# Patient Record
Sex: FEMALE | Race: WHITE | Employment: FULL TIME | URBAN - METROPOLITAN AREA
[De-identification: names, ages, dates, MRNs, and addresses within clinical notes are randomized per-mention and may not be internally consistent; named-entity substitution may affect disease eponyms.]

---

## 2019-06-20 LAB — COLONOSCOPY, EXTERNAL: NORMAL

## 2022-07-22 ENCOUNTER — OFFICE VISIT (OUTPATIENT)
Dept: ORTHOPEDIC SURGERY | Age: 67
End: 2022-07-22
Payer: MEDICARE

## 2022-07-22 VITALS — BODY MASS INDEX: 29.26 KG/M2 | HEIGHT: 62 IN | WEIGHT: 159 LBS

## 2022-07-22 DIAGNOSIS — M16.12 PRIMARY OSTEOARTHRITIS OF LEFT HIP: ICD-10-CM

## 2022-07-22 DIAGNOSIS — M25.552 LEFT HIP PAIN: Primary | ICD-10-CM

## 2022-07-22 PROCEDURE — G8427 DOCREV CUR MEDS BY ELIG CLIN: HCPCS | Performed by: ORTHOPAEDIC SURGERY

## 2022-07-22 PROCEDURE — 3017F COLORECTAL CA SCREEN DOC REV: CPT | Performed by: ORTHOPAEDIC SURGERY

## 2022-07-22 PROCEDURE — G8417 CALC BMI ABV UP PARAM F/U: HCPCS | Performed by: ORTHOPAEDIC SURGERY

## 2022-07-22 PROCEDURE — G8432 DEP SCR NOT DOC, RNG: HCPCS | Performed by: ORTHOPAEDIC SURGERY

## 2022-07-22 PROCEDURE — 1101F PT FALLS ASSESS-DOCD LE1/YR: CPT | Performed by: ORTHOPAEDIC SURGERY

## 2022-07-22 PROCEDURE — 99203 OFFICE O/P NEW LOW 30 MIN: CPT | Performed by: ORTHOPAEDIC SURGERY

## 2022-07-22 PROCEDURE — G8400 PT W/DXA NO RESULTS DOC: HCPCS | Performed by: ORTHOPAEDIC SURGERY

## 2022-07-22 PROCEDURE — G8536 NO DOC ELDER MAL SCRN: HCPCS | Performed by: ORTHOPAEDIC SURGERY

## 2022-07-22 PROCEDURE — 1090F PRES/ABSN URINE INCON ASSESS: CPT | Performed by: ORTHOPAEDIC SURGERY

## 2022-07-22 PROCEDURE — 1123F ACP DISCUSS/DSCN MKR DOCD: CPT | Performed by: ORTHOPAEDIC SURGERY

## 2022-07-22 RX ORDER — ERGOCALCIFEROL 1.25 MG/1
CAPSULE ORAL
COMMUNITY
Start: 2022-06-27 | End: 2022-10-06 | Stop reason: ALTCHOICE

## 2022-07-22 RX ORDER — ASCORBIC ACID 500 MG
500 TABLET ORAL 2 TIMES DAILY
COMMUNITY
Start: 2022-05-26 | End: 2022-10-06 | Stop reason: ALTCHOICE

## 2022-07-22 RX ORDER — IRON,CARBONYL/ASCORBIC ACID 65MG-125MG
1 TABLET, DELAYED RELEASE (ENTERIC COATED) ORAL DAILY
COMMUNITY
Start: 2022-05-26 | End: 2022-10-06 | Stop reason: ALTCHOICE

## 2022-07-22 RX ORDER — IBUPROFEN 200 MG
400 TABLET ORAL
COMMUNITY

## 2022-07-22 NOTE — LETTER
7/22/2022    Patient: Jani Black   YOB: 1955   Date of Visit: 7/22/2022     Rukhsana Robles MD  [address] [address]  Via Fax: [phone]    Dear Rukhsana Robles MD,      Thank you for referring Ms. Jani Black to [facility] for evaluation. My notes for this consultation are attached. If you have questions, please do not hesitate to call me. I look forward to following your patient along with you.       Sincerely,    Pippa Lomax MD

## 2022-07-22 NOTE — PROGRESS NOTES
Fantasma Gonzalez (: 1955) is a 78 y.o. female, patient, here for evaluation of the following chief complaint(s):  Hip Pain (Left hip pain - would like to discuss surgery today )       HPI:  Chief complaint left hip pain. Chronic nature of left hip pain greater than 2 years. Slowly progressive symptoms. No images for review today. Patient states that she is thought about this now for some time and is ready to get her hip replaced. She has had x-rays in the past which showed bone against bone. Allergies   Allergen Reactions    Gabapentin Other (comments)     Edema     Lisinopril Cough       Current Outpatient Medications   Medication Sig    ibuprofen (MOTRIN) 200 mg tablet Take 400 mg by mouth two (2) times daily as needed. ergocalciferol (ERGOCALCIFEROL) 1,250 mcg (50,000 unit) capsule TAKE 1 CAPSULE BY MOUTH 1 TIME PER WEEK. iron,carbonyl-vitamin C (Vitron-C) 65 mg iron- 125 mg TbEC Take 1 Tablet by mouth daily. losartan (COZAAR) 100 mg tablet Take 100 mg by mouth daily. hydrochlorothiazide (HYDRODIURIL) 25 mg tablet Take 25 mg by mouth daily. escitalopram oxalate (LEXAPRO) 20 mg tablet Take 20 mg by mouth daily. ARIPiprazole (ABILIFY) 5 mg tablet Take 5 mg by mouth daily. dextroamphetamine-amphetamine (ADDERALL) 15 mg tablet Take 15 mg by mouth two (2) times a day. ALPRAZolam (XANAX) 0.5 mg tablet Take 0.5 mg by mouth. ascorbic acid, vitamin C, (VITAMIN C) 500 mg tablet Take 500 mg by mouth two (2) times a day. (Patient not taking: Reported on 2022)    bacitracin-polymyxin b (POLYSPORIN) ophthalmic ointment Apply every 12 hours to upper and lower lid incisions. oxyCODONE IR (ROXICODONE) 5 mg immediate release tablet Take 1 Tab by mouth every four (4) hours as needed for Pain (May take 2). ondansetron (ZOFRAN ODT) 4 mg disintegrating tablet Take 1 Tab by mouth every eight (8) hours as needed for Nausea.     aspirin delayed-release 81 mg tablet Take 81 mg by mouth daily. No current facility-administered medications for this visit. Past Medical History:   Diagnosis Date    Attention deficit disorder     Hepatitis C     Hypertension     Nausea & vomiting     Other ill-defined conditions(799.89)     hep c- treated 2002    Other ill-defined conditions(799.89)     ADD    Other ill-defined conditions(799.89)     depression    Unspecified adverse effect of anesthesia     delayed awakening         Past Surgical History:   Procedure Laterality Date    HX OTHER SURGICAL  1981    right inguinal lymph node removed- benign    HX OTHER SURGICAL  1990s    exploratoey lap for adhesions       Family History   Problem Relation Age of Onset    Hypertension Mother     Lung Disease Mother     Hypertension Father     Heart Disease Father 39        MI    Cancer Father         head and neck cancer    Hypertension Sister         Social History     Socioeconomic History    Marital status:      Spouse name: Not on file    Number of children: Not on file    Years of education: Not on file    Highest education level: Not on file   Occupational History    Not on file   Tobacco Use    Smoking status: Some Days     Years: 0.50     Types: Cigarettes    Smokeless tobacco: Never    Tobacco comments:     social smoking   Substance and Sexual Activity    Alcohol use:  Yes     Alcohol/week: 11.7 standard drinks     Types: 14 Glasses of wine per week    Drug use: No    Sexual activity: Not on file   Other Topics Concern    Not on file   Social History Narrative    Not on file     Social Determinants of Health     Financial Resource Strain: Not on file   Food Insecurity: Not on file   Transportation Needs: Not on file   Physical Activity: Not on file   Stress: Not on file   Social Connections: Not on file   Intimate Partner Violence: Not on file   Housing Stability: Not on file       ROS    Positive for: Musculoskeletal  Last edited by Jay Roche on 7/22/2022  2:42 PM.            Vitals:  Ht 5' 2\" (1.575 m)   Wt 159 lb (72.1 kg)   BMI 29.08 kg/m²    Body mass index is 29.08 kg/m². PHYSICAL EXAM:  Left lower extremity exam: Hip is quite irritable to rotation. Extends fully and flexes to 90 degrees. There is no trochanteric tenderness. Hip flexors and abductors have 5 x 5 strength. Patient able to stand on the affected lower extremity with negative Trendelenburg. Extremity has intact quads, ankle plantar flexors, ankle dorsiflexors. Skin is intact. Foot is sensate and well perfused. Straight leg raise and femoral nerve stretch test are negative. Left lower extremity 1/4 inch shorter than the right. IMAGING:  XR Results (most recent):  Results from Appointment encounter on 07/22/22    XR HIP LT W OR WO PELV 2-3 VWS    Narrative  3 views left hip. Severe bone-on-bone left hip. Some osteitis pubis. No acute issues are noted. Osteophytes at head neck junction and also posterior acetabulum. Subchondral cyst.        ASSESSMENT/PLAN:  1. Left hip pain  -     XR HIP LT W OR WO PELV 2-3 VWS; Future  2. Primary osteoarthritis of left hip    End-stage DJD left hip. We discussed continued conservative treatment measures versus total joint replacement. Symptoms have progressed despite conservative treatment measures outlined above and they desire to proceed with total hip. Patient has had symptoms for over 24 months. They have decline in their activities of daily living with inability to walk long distances. There has been progressive decline in function. Discussed risks, benefits, and alternatives in detail, as well as anticipated hospital stay and course of rehabilitation. They will see their primary care physician prior to surgery. All questions answered. An electronic signature was used to authenticate this note.   --Vonna Goltz, MD

## 2022-08-02 DIAGNOSIS — M16.12 PRIMARY OSTEOARTHRITIS OF LEFT HIP: Primary | ICD-10-CM

## 2022-10-06 ENCOUNTER — OFFICE VISIT (OUTPATIENT)
Dept: INTERNAL MEDICINE CLINIC | Age: 67
End: 2022-10-06
Payer: MEDICARE

## 2022-10-06 VITALS
RESPIRATION RATE: 20 BRPM | DIASTOLIC BLOOD PRESSURE: 59 MMHG | WEIGHT: 154.6 LBS | HEART RATE: 83 BPM | TEMPERATURE: 98.4 F | SYSTOLIC BLOOD PRESSURE: 108 MMHG | OXYGEN SATURATION: 96 % | HEIGHT: 62 IN | BODY MASS INDEX: 28.45 KG/M2

## 2022-10-06 DIAGNOSIS — I10 PRIMARY HYPERTENSION: Primary | ICD-10-CM

## 2022-10-06 DIAGNOSIS — I10 PRIMARY HYPERTENSION: ICD-10-CM

## 2022-10-06 DIAGNOSIS — M16.12 PRIMARY OSTEOARTHRITIS OF LEFT HIP: ICD-10-CM

## 2022-10-06 DIAGNOSIS — Z01.818 PREOP EXAM FOR INTERNAL MEDICINE: ICD-10-CM

## 2022-10-06 DIAGNOSIS — F51.01 PRIMARY INSOMNIA: ICD-10-CM

## 2022-10-06 DIAGNOSIS — Z23 NEEDS FLU SHOT: ICD-10-CM

## 2022-10-06 DIAGNOSIS — F33.1 MODERATE EPISODE OF RECURRENT MAJOR DEPRESSIVE DISORDER (HCC): ICD-10-CM

## 2022-10-06 PROCEDURE — G8427 DOCREV CUR MEDS BY ELIG CLIN: HCPCS | Performed by: INTERNAL MEDICINE

## 2022-10-06 PROCEDURE — 1090F PRES/ABSN URINE INCON ASSESS: CPT | Performed by: INTERNAL MEDICINE

## 2022-10-06 PROCEDURE — G8399 PT W/DXA RESULTS DOCUMENT: HCPCS | Performed by: INTERNAL MEDICINE

## 2022-10-06 PROCEDURE — G0463 HOSPITAL OUTPT CLINIC VISIT: HCPCS | Performed by: INTERNAL MEDICINE

## 2022-10-06 PROCEDURE — 1101F PT FALLS ASSESS-DOCD LE1/YR: CPT | Performed by: INTERNAL MEDICINE

## 2022-10-06 PROCEDURE — 90694 VACC AIIV4 NO PRSRV 0.5ML IM: CPT | Performed by: INTERNAL MEDICINE

## 2022-10-06 PROCEDURE — 3017F COLORECTAL CA SCREEN DOC REV: CPT | Performed by: INTERNAL MEDICINE

## 2022-10-06 PROCEDURE — G8536 NO DOC ELDER MAL SCRN: HCPCS | Performed by: INTERNAL MEDICINE

## 2022-10-06 PROCEDURE — G8511 SCR DEP POS, NO PLAN DOC RNG: HCPCS | Performed by: INTERNAL MEDICINE

## 2022-10-06 PROCEDURE — 99204 OFFICE O/P NEW MOD 45 MIN: CPT | Performed by: INTERNAL MEDICINE

## 2022-10-06 PROCEDURE — G8417 CALC BMI ABV UP PARAM F/U: HCPCS | Performed by: INTERNAL MEDICINE

## 2022-10-06 PROCEDURE — 93010 ELECTROCARDIOGRAM REPORT: CPT | Performed by: INTERNAL MEDICINE

## 2022-10-06 PROCEDURE — 93005 ELECTROCARDIOGRAM TRACING: CPT | Performed by: INTERNAL MEDICINE

## 2022-10-06 RX ORDER — GLUCOSAMINE SULFATE 1500 MG
25 POWDER IN PACKET (EA) ORAL DAILY
COMMUNITY

## 2022-10-06 RX ORDER — DEXTROAMPHETAMINE SACCHARATE, AMPHETAMINE ASPARTATE, DEXTROAMPHETAMINE SULFATE AND AMPHETAMINE SULFATE 5; 5; 5; 5 MG/1; MG/1; MG/1; MG/1
20 TABLET ORAL 2 TIMES DAILY
COMMUNITY
Start: 2022-08-30

## 2022-10-06 RX ORDER — ARIPIPRAZOLE 10 MG/1
10 TABLET ORAL DAILY
Qty: 30 TABLET | Refills: 5 | Status: SHIPPED | OUTPATIENT
Start: 2022-10-06 | End: 2022-10-21 | Stop reason: SDUPTHER

## 2022-10-06 RX ORDER — MIRTAZAPINE 15 MG/1
15 TABLET, FILM COATED ORAL AS NEEDED
COMMUNITY
Start: 2022-03-01

## 2022-10-06 NOTE — PROGRESS NOTES
HISTORY OF PRESENT ILLNESS  Macario Perkins is a 75 y.o. female. HPI  New patient to our practice. Hx htn, depression. , insomnia and arthritis. Depression - on Mirtazepine for sleep. Not very effective. Seeing neuropsychiatrist who manages her Adderall (for depression and ADD) but he is retiring in December. Previous PCP was managing her Lexapro, Abilify and Mirtazepine. Used to have Xanax but rx . Low energy and not engaged. Has tried to stop her anti-depressants but \"disasterous\". Also tried other medications than Lexapro but had severe side effects. Htn - on losartan and hctz for approx 10 years. No CP, tightness, sob, le edema, dizziness or palpitations. Hx of hep C but treated and virus has cleared. -     Arthritis - both knees, hips and shoulders. To have Left THR  with Dr. Gilles Truong. Needs preop forms completed. Past Medical History:   Diagnosis Date    Attention deficit disorder     Hepatitis C     Hypertension     Nausea & vomiting     Other ill-defined conditions(799.89)     hep c- treated     Other ill-defined conditions(799.89)     ADD    Other ill-defined conditions(799.89)     depression    Unspecified adverse effect of anesthesia     delayed awakening      Past Surgical History:   Procedure Laterality Date    HX OTHER SURGICAL      right inguinal lymph node removed- benign    HX OTHER SURGICAL      exploratoey lap for adhesions       Current Outpatient Medications:     ibuprofen (MOTRIN) 200 mg tablet, Take 400 mg by mouth two (2) times daily as needed. , Disp: , Rfl:     ascorbic acid, vitamin C, (VITAMIN C) 500 mg tablet, Take 500 mg by mouth two (2) times a day. (Patient not taking: Reported on 2022), Disp: , Rfl:     ergocalciferol (ERGOCALCIFEROL) 1,250 mcg (50,000 unit) capsule, TAKE 1 CAPSULE BY MOUTH 1 TIME PER WEEK., Disp: , Rfl:     iron,carbonyl-vitamin C (Vitron-C) 65 mg iron- 125 mg TbEC, Take 1 Tablet by mouth daily. , Disp: , Rfl:     losartan (COZAAR) 100 mg tablet, Take 100 mg by mouth daily. , Disp: , Rfl:     hydrochlorothiazide (HYDRODIURIL) 25 mg tablet, Take 25 mg by mouth daily. , Disp: , Rfl:     escitalopram oxalate (LEXAPRO) 20 mg tablet, Take 20 mg by mouth daily. , Disp: , Rfl:     ARIPiprazole (ABILIFY) 5 mg tablet, Take 5 mg by mouth daily. , Disp: , Rfl:     dextroamphetamine-amphetamine (ADDERALL) 15 mg tablet, Take 15 mg by mouth two (2) times a day., Disp: , Rfl:     ALPRAZolam (XANAX) 0.5 mg tablet, Take 0.5 mg by mouth., Disp: , Rfl:     Family History   Problem Relation Age of Onset    Hypertension Mother     Lung Disease Mother     Hypertension Father     Heart Disease Father 39        MI    Cancer Father         head and neck cancer    Hypertension Sister       Social hx reviewed and updated in chart. Review of Systems   Constitutional:  Positive for malaise/fatigue and weight loss (intentional. over 1 year has lost 16 to 17 lbs.  eating less). Negative for chills, diaphoresis and fever. HENT: Negative. Eyes: Negative. Wears reading glasses. Respiratory: Negative. Cardiovascular: Negative. Gastrointestinal: Negative. Genitourinary:  Negative for dysuria, flank pain, frequency, hematuria and urgency. Mixed urinary incontinence   Musculoskeletal:  Positive for back pain (low back pain, spinal stenosis. Had TABBY several years ago) and joint pain. Negative for myalgias and neck pain. Skin:         Psoriasis. Previously treated with light treatment. Neurological:  Negative for dizziness, tingling, tremors, sensory change, speech change, focal weakness, weakness and headaches. Endo/Heme/Allergies:  Negative for environmental allergies and polydipsia. Bruises/bleeds easily. Psychiatric/Behavioral:  Positive for depression. Negative for suicidal ideas. The patient has insomnia. The patient is not nervous/anxious.       Visit Vitals  BP (!) 108/59 (BP 1 Location: Left upper arm, BP Patient Position: Sitting, BP Cuff Size: Large adult)   Pulse 83   Temp 98.4 °F (36.9 °C) (Oral)   Resp 20   Ht 5' 2\" (1.575 m)   Wt 154 lb 9.6 oz (70.1 kg)   SpO2 96%   BMI 28.28 kg/m²       Physical Exam  Vitals reviewed. Constitutional:       Appearance: Normal appearance. HENT:      Head: Normocephalic and atraumatic. Right Ear: Tympanic membrane, ear canal and external ear normal.      Left Ear: Tympanic membrane, ear canal and external ear normal.      Nose: Nose normal.      Mouth/Throat:      Mouth: Mucous membranes are moist.      Pharynx: Oropharynx is clear. Eyes:      Extraocular Movements: Extraocular movements intact. Conjunctiva/sclera: Conjunctivae normal.      Pupils: Pupils are equal, round, and reactive to light. Neck:      Vascular: No carotid bruit. Cardiovascular:      Rate and Rhythm: Normal rate and regular rhythm. Pulses: Normal pulses. Heart sounds: Normal heart sounds. Pulmonary:      Effort: Pulmonary effort is normal.      Breath sounds: Normal breath sounds. Abdominal:      General: Abdomen is flat. Bowel sounds are normal. There is no distension. Palpations: Abdomen is soft. There is no mass. Tenderness: There is no abdominal tenderness. Musculoskeletal:      Cervical back: Normal range of motion and neck supple. Right lower leg: No edema. Left lower leg: No edema. Lymphadenopathy:      Cervical: No cervical adenopathy. Skin:     General: Skin is warm and dry. Neurological:      General: No focal deficit present. Mental Status: She is alert. ASSESSMENT and PLAN  Diagnoses and all orders for this visit:    1. Primary hypertension - well controlled, cont Losartan 100mg daily and hctz 25mg every day. -     AMB POC EKG ROUTINE W/ 12 LEADS, INTER & REP  -     MRSA SCREENING CULTURE  -     PROTHROMBIN TIME + INR; Future  -     HEMOGLOBIN A1C WITH EAG;  Future  -     URINALYSIS W/ REFLEX CULTURE; Future  - METABOLIC PANEL, BASIC; Future  -     CBC W/O DIFF; Future    2. Primary osteoarthritis of left hip - upcoming THR. Preop forms completed. -     AMB POC EKG ROUTINE W/ 12 LEADS, INTER & REP  -     MRSA SCREENING CULTURE  -     PROTHROMBIN TIME + INR; Future  -     HEMOGLOBIN A1C WITH EAG; Future  -     URINALYSIS W/ REFLEX CULTURE; Future  -     METABOLIC PANEL, BASIC; Future  -     CBC W/O DIFF; Future    3. Moderate episode of recurrent major depressive disorder (HCC) - not at goal . Continue same dose Lexapro 20mg every day. Increase Ability to 20mg daily. Continue Adderall 20mg twice a day  -     ARIPiprazole (ABILIFY) 10 mg tablet; Take 1 Tablet by mouth daily. 4. Primary insomnia - not as well controlled. Continue current dose Mirtazapine 15mg qhs for now but may change or increase in the future    5. Preop exam for internal medicine - see scanned preop form. -     AMB POC EKG ROUTINE W/ 12 LEADS, INTER & REP  -     MRSA SCREENING CULTURE  -     PROTHROMBIN TIME + INR; Future  -     HEMOGLOBIN A1C WITH EAG; Future  -     URINALYSIS W/ REFLEX CULTURE; Future  -     METABOLIC PANEL, BASIC; Future  -     CBC W/O DIFF; Future    6.  Needs flu shot  -     INFLUENZA, FLUAD, (AGE 72 Y+), IM, PF, 0.5 ML  -     ADMIN INFLUENZA VIRUS VAC      Follow-up and Dispositions    Return in about 2 months (around 12/6/2022) for depression, MWE.

## 2022-10-07 LAB
ANION GAP SERPL CALC-SCNC: 8 MMOL/L (ref 5–15)
APPEARANCE UR: CLEAR
BACTERIA SPEC CULT: NORMAL
BACTERIA URNS QL MICRO: ABNORMAL /HPF
BILIRUB UR QL: NEGATIVE
BUN SERPL-MCNC: 26 MG/DL (ref 6–20)
BUN/CREAT SERPL: 29 (ref 12–20)
CALCIUM SERPL-MCNC: 10.1 MG/DL (ref 8.5–10.1)
CHLORIDE SERPL-SCNC: 101 MMOL/L (ref 97–108)
CO2 SERPL-SCNC: 29 MMOL/L (ref 21–32)
COLOR UR: ABNORMAL
CREAT SERPL-MCNC: 0.89 MG/DL (ref 0.55–1.02)
EPITH CASTS URNS QL MICRO: ABNORMAL /LPF
ERYTHROCYTE [DISTWIDTH] IN BLOOD BY AUTOMATED COUNT: 13.6 % (ref 11.5–14.5)
EST. AVERAGE GLUCOSE BLD GHB EST-MCNC: 111 MG/DL
GLUCOSE SERPL-MCNC: 102 MG/DL (ref 65–100)
GLUCOSE UR STRIP.AUTO-MCNC: NEGATIVE MG/DL
HBA1C MFR BLD: 5.5 % (ref 4–5.6)
HCT VFR BLD AUTO: 43 % (ref 35–47)
HGB BLD-MCNC: 13.5 G/DL (ref 11.5–16)
HGB UR QL STRIP: NEGATIVE
HYALINE CASTS URNS QL MICRO: ABNORMAL /LPF (ref 0–5)
INR PPP: 1 (ref 0.9–1.1)
KETONES UR QL STRIP.AUTO: ABNORMAL MG/DL
LEUKOCYTE ESTERASE UR QL STRIP.AUTO: ABNORMAL
MCH RBC QN AUTO: 30.8 PG (ref 26–34)
MCHC RBC AUTO-ENTMCNC: 31.4 G/DL (ref 30–36.5)
MCV RBC AUTO: 97.9 FL (ref 80–99)
NITRITE UR QL STRIP.AUTO: NEGATIVE
NRBC # BLD: 0 K/UL (ref 0–0.01)
NRBC BLD-RTO: 0 PER 100 WBC
PH UR STRIP: 6.5 [PH] (ref 5–8)
PLATELET # BLD AUTO: 349 K/UL (ref 150–400)
PMV BLD AUTO: 10.2 FL (ref 8.9–12.9)
POTASSIUM SERPL-SCNC: 3.8 MMOL/L (ref 3.5–5.1)
PROT UR STRIP-MCNC: NEGATIVE MG/DL
PROTHROMBIN TIME: 10.8 SEC (ref 9–11.1)
RBC # BLD AUTO: 4.39 M/UL (ref 3.8–5.2)
RBC #/AREA URNS HPF: ABNORMAL /HPF (ref 0–5)
SERVICE CMNT-IMP: NORMAL
SODIUM SERPL-SCNC: 138 MMOL/L (ref 136–145)
SP GR UR REFRACTOMETRY: 1.02 (ref 1–1.03)
UA: UC IF INDICATED,UAUC: ABNORMAL
UROBILINOGEN UR QL STRIP.AUTO: 1 EU/DL (ref 0.2–1)
WBC # BLD AUTO: 8.8 K/UL (ref 3.6–11)
WBC URNS QL MICRO: ABNORMAL /HPF (ref 0–4)

## 2022-10-22 RX ORDER — ARIPIPRAZOLE 10 MG/1
10 TABLET ORAL DAILY
Qty: 30 TABLET | Refills: 5 | Status: SHIPPED | OUTPATIENT
Start: 2022-10-22

## 2022-10-24 ENCOUNTER — TELEPHONE (OUTPATIENT)
Dept: INTERNAL MEDICINE CLINIC | Age: 67
End: 2022-10-24

## 2022-10-24 DIAGNOSIS — Z01.818 PREOP EXAM FOR INTERNAL MEDICINE: ICD-10-CM

## 2022-10-24 DIAGNOSIS — M16.12 PRIMARY OSTEOARTHRITIS OF LEFT HIP: Primary | ICD-10-CM

## 2022-10-24 NOTE — TELEPHONE ENCOUNTER
Pt would like a call about MRSA form with more info as soon as possible. Pt stated that she went to [address] [place] [address],  [address] lab to get this done, and she was told that they were unable to do this.  She would like a callback to know what her next step should be.     [phone]

## 2022-10-26 ENCOUNTER — OFFICE VISIT (OUTPATIENT)
Dept: ORTHOPEDIC SURGERY | Age: 67
End: 2022-10-26
Payer: MEDICARE

## 2022-10-26 DIAGNOSIS — M25.552 LEFT HIP PAIN: Primary | ICD-10-CM

## 2022-10-26 DIAGNOSIS — R26.2 DIFFICULTY WALKING: ICD-10-CM

## 2022-10-26 DIAGNOSIS — M16.12 PRIMARY OSTEOARTHRITIS OF LEFT HIP: ICD-10-CM

## 2022-10-26 PROCEDURE — 97161 PT EVAL LOW COMPLEX 20 MIN: CPT | Performed by: PHYSICAL THERAPIST

## 2022-10-26 PROCEDURE — 97530 THERAPEUTIC ACTIVITIES: CPT | Performed by: PHYSICAL THERAPIST

## 2022-10-26 NOTE — PROGRESS NOTES
Patient Name: Yousif Bonilla  Date:10/26/2022  : 1955  [x]  Patient  Verified  Payor: Jace Rivera / Plan: [place] & [place] / Product Type: Medicare /      Total Treatment Time (min): 45  Total Timed Codes (min): 45    Visit #: 1    Referring Provider: Jaime Walters MD        Preop evaluation left hip    Subjective: The patient is a retired 69-year-old female. She is referred to physical therapy by Dr. Dhara Randall. The patient is referred for preoperative evaluation and home activity instruction for an impending outpatient left anterior total hip arthroplasty. Surgery scheduled for 2022. The patient reports progressive history of longstanding hip pain with failed conservative treatment. She reports difficulty with basic as well as advanced ADLs secondary to her arthritic changes. She reports difficulty with prolonged ambulation has constant pain. She will have help at home following surgery she does have 4 steps into the home.   Past Medical History:   Diagnosis Date    Attention deficit disorder     Hepatitis C     Hypertension     Nausea & vomiting     Other ill-defined conditions(799.89)     hep c- treated     Other ill-defined conditions(799.89)     ADD    Other ill-defined conditions(799.89)     depression    Unspecified adverse effect of anesthesia     delayed awakening      Current Outpatient Medications   Medication Instructions    ALPRAZolam (XANAX) 0.5 mg, Oral, BEDTIME PRN    ARIPiprazole (ABILIFY) 10 mg, Oral, DAILY    cholecalciferol (VITAMIN D3) 25 mcg, Oral, DAILY    dextroamphetamine-amphetamine (ADDERALL) 20 mg tablet 20 mg, Oral, 2 TIMES DAILY    escitalopram oxalate (LEXAPRO) 20 mg, DAILY    hydroCHLOROthiazide (HYDRODIURIL) 25 mg, DAILY    ibuprofen (MOTRIN) 400 mg, Oral, 2 TIMES DAILY AS NEEDED    losartan (COZAAR) 100 mg, DAILY    mirtazapine (REMERON) 15 mg, Oral, AS NEEDED     Social Connections: Not on file       Objective:    Gait: antalgic gait pattern noted on the left lower extremity has decreased stance time as well as lateral trunk shift for maintaining balance. Balance: Single-leg stance reveals less than 2 seconds. He is noted to have femoral adduction and internal rotation with gluteus medius weakness. Range of motion right hip: Flexion 90 degrees, extension neutral abduction 20 degrees abduction neutral  Strength: Quadriceps strength: 4/5 . Hip abduction: 3/5       HIP Flexion:   4/5    Soft tissue: restriction is noted of the quadriceps, rectus, hamstrings and IT band musculature. Neuro Exam: intact    Treatment:  Low complexity evaluation 20 min  Therapeutic activity instruction 25 min   The patient's mobility, range of motion and function was assessed today for postoperative training and instruction. They were instructed in functional gait utilizing a standard rolling walker   Stair training was completed with hand-held assist and rail. Patient was instructed in and completed functional exercises for postoperative quadriceps activation,DVT prophylaxis and swelling control, including elevation and the use of cryotherapy. We discussed transfers to and from a car, sit to/form supine,     Written and pictorial exercises and care instructions were provided to the patient. Assessment:  Patient presents with impairments related to gait, range of motion, quad strength, balance, impaired ability to ambulate, negotiate stairs, perform ADLs and participate in desired activities second to right hip OA and avascular necrosis. Outpatient total hip arthroplasty is scheduled for November 2, 2022. She he will benefit from PT to address above limitations and maximize function  When he returns home prior to the onset of home health therapy. Goals- 1 visit  1. Patient will demonstrate compliance with home exercise program.  2. Patient to demonstrate [place] with gait using device walker/  3.  Patient to demonstrate independence with stairs with hand held assistance and rail    Plan:  Patient to receive home health PT post operatively day 1-3 for 2-4 weeks and then return to the clinic for outpatient Physical Therapy. When she returns for outpatient care we will update her goals and plan of care. The referring physician has reviewed and approved this evaluation and plan of care as noted by the electronic signature attached to note.

## 2022-10-28 LAB — MRSA SPEC QL CULT: NEGATIVE

## 2022-11-01 DIAGNOSIS — M16.12 PRIMARY OSTEOARTHRITIS OF LEFT HIP: Primary | ICD-10-CM

## 2022-11-01 DIAGNOSIS — Z96.642 STATUS POST TOTAL HIP REPLACEMENT, LEFT: ICD-10-CM

## 2022-11-01 RX ORDER — ASPIRIN 325 MG
325 TABLET ORAL 2 TIMES DAILY WITH MEALS
Qty: 60 TABLET | Refills: 0 | Status: SHIPPED | OUTPATIENT
Start: 2022-11-01 | End: 2022-11-29 | Stop reason: ALTCHOICE

## 2022-11-01 RX ORDER — NALOXONE HYDROCHLORIDE 4 MG/.1ML
SPRAY NASAL
Qty: 1 EACH | Refills: 0 | Status: SHIPPED | OUTPATIENT
Start: 2022-11-01

## 2022-11-01 RX ORDER — AMOXICILLIN 250 MG
1 CAPSULE ORAL 2 TIMES DAILY
Qty: 50 TABLET | Refills: 0 | Status: SHIPPED | OUTPATIENT
Start: 2022-11-01

## 2022-11-01 RX ORDER — OXYCODONE HYDROCHLORIDE 5 MG/1
5-10 TABLET ORAL
Qty: 50 TABLET | Refills: 0 | Status: SHIPPED | OUTPATIENT
Start: 2022-11-01 | End: 2022-11-08

## 2022-11-04 RX ORDER — ONDANSETRON 8 MG/1
8 TABLET, ORALLY DISINTEGRATING ORAL
Qty: 12 TABLET | Refills: 0 | Status: SHIPPED | OUTPATIENT
Start: 2022-11-04

## 2022-11-10 DIAGNOSIS — Z96.642 STATUS POST TOTAL HIP REPLACEMENT, LEFT: Primary | ICD-10-CM

## 2022-11-11 RX ORDER — OXYCODONE HYDROCHLORIDE 5 MG/1
5 TABLET ORAL
Qty: 30 TABLET | Refills: 0 | Status: SHIPPED | OUTPATIENT
Start: 2022-11-11 | End: 2022-11-18

## 2022-11-29 ENCOUNTER — OFFICE VISIT (OUTPATIENT)
Dept: ORTHOPEDIC SURGERY | Age: 67
End: 2022-11-29
Payer: MEDICARE

## 2022-11-29 VITALS — HEIGHT: 62 IN | WEIGHT: 154 LBS | BODY MASS INDEX: 28.34 KG/M2

## 2022-11-29 DIAGNOSIS — Z96.642 STATUS POST TOTAL REPLACEMENT OF LEFT HIP: Primary | ICD-10-CM

## 2022-11-29 NOTE — PROGRESS NOTES
Lissette Mistry (: 1955) is a 75 y.o. female, established patient, here for evaluation of the following chief complaint(s):  Surgical Follow-up       SUBJECTIVE/OBJECTIVE:    Lissette Mistry (: 1955) is a 75 y.o. female. Left total hip replacement-2022    The patient is very pleased with the results of her surgery and the progress which she has made to date. The patient states she is able to walk 2 miles without significant discomfort. Allergies   Allergen Reactions    Gabapentin Other (comments)     Edema     Lisinopril Cough       Current Outpatient Medications   Medication Sig    ondansetron (ZOFRAN ODT) 8 mg disintegrating tablet Take 1 Tablet by mouth every eight (8) hours as needed for Nausea or Vomiting. aspirin (ASPIRIN) 325 mg tablet Take 1 Tablet by mouth two (2) times daily (with meals). Indications: post op DVT prevention    senna-docusate (PERICOLACE) 8.6-50 mg per tablet Take 1 Tablet by mouth two (2) times a day. Indications: constipation, opioid induced constipation    naloxone (Narcan) 4 mg/actuation nasal spray Use 1 spray intranasally, then discard. Repeat with new spray every 2 min as needed for opioid overdose symptoms, alternating nostrils. Indications: opioid overdose, decrease in rate & depth of breathing due to opioid drug    ARIPiprazole (ABILIFY) 10 mg tablet Take 1 Tablet by mouth daily. dextroamphetamine-amphetamine (ADDERALL) 20 mg tablet Take 20 mg by mouth two (2) times a day. cholecalciferol (VITAMIN D3) 25 mcg (1,000 unit) cap Take 25 mcg by mouth daily. mirtazapine (REMERON) 15 mg tablet Take 15 mg by mouth as needed. ibuprofen (MOTRIN) 200 mg tablet Take 400 mg by mouth two (2) times daily as needed. losartan (COZAAR) 100 mg tablet Take 100 mg by mouth daily. hydrochlorothiazide (HYDRODIURIL) 25 mg tablet Take 25 mg by mouth daily. escitalopram oxalate (LEXAPRO) 20 mg tablet Take 20 mg by mouth daily.     ALPRAZolam (XANAX) 0.5 mg tablet Take 0.5 mg by mouth nightly as needed. No current facility-administered medications for this visit. Social History     Socioeconomic History    Marital status:      Spouse name: Not on file    Number of children: Not on file    Years of education: Not on file    Highest education level: Not on file   Occupational History    Not on file   Tobacco Use    Smoking status: Former     Years: 0.50     Types: Cigarettes     Quit date: 10/1/2020     Years since quittin.1    Smokeless tobacco: Never    Tobacco comments:     social smoking   Vaping Use    Vaping Use: Never used   Substance and Sexual Activity    Alcohol use: Yes     Alcohol/week: 11.7 standard drinks     Types: 14 Glasses of wine per week    Drug use: No    Sexual activity: Not on file   Other Topics Concern    Not on file   Social History Narrative    Not on file     Social Determinants of Health     Financial Resource Strain: Not on file   Food Insecurity: Not on file   Transportation Needs: Not on file   Physical Activity: Not on file   Stress: Not on file   Social Connections: Not on file   Intimate Partner Violence: Not on file   Housing Stability: Not on file       Past Surgical History:   Procedure Laterality Date    HX OTHER SURGICAL      right inguinal lymph node removed- benign    HX OTHER SURGICAL      exploratoey lap for adhesions       Family History   Problem Relation Age of Onset    Hypertension Mother     Lung Disease Mother     Hypertension Father     Heart Disease Father 39        MI    Cancer Father         head and neck cancer    Hypertension Sister         OB History          2    Para   0    Term                AB   1    Living   0         SAB   1    IAB        Ectopic        Molar        Multiple        Live Births                       REVIEW OF SYSTEMS:    Patient denies any recent fever, chills, nausea, vomiting, chest pain, or shortness of breath.       Vitals:    Ht 5' 2\" (1.575 m) Wt 154 lb (69.9 kg)   BMI 28.17 kg/m²    Body mass index is 28.17 kg/m². PHYSICAL EXAM:    The patient is alert and oriented x3 and in no acute distress. The postoperative wound is well-healed without erythema or drainage. There is pain free ROM of the operative hip. There is no calf tenderness to palpation. Distal motor and sensation is intact. IMAGING:    Results from Appointment encounter on 11/29/22    XR HIP LT W OR WO PELV 2-3 VWS    Narrative  AP pelvis, AP and frog lateral digital view radiographs of the left hip were obtained in the office today and reviewed with the patient and demonstrate anatomic alignment of components with no evidence of hardware loosening or subsidence. Orders Placed This Encounter    XR HIP LT W OR WO PELV 2-3 VWS     Standing Status:   Future     Number of Occurrences:   1     Standing Expiration Date:   11/30/2023    REFERRAL TO PHYSICAL THERAPY     Referral Priority:   Routine     Referral Type:   PT/OT/ST     Requested Specialty:   Physical Therapy     Number of Visits Requested:   1          ASSESSMENT/PLAN:      1. Status post total replacement of left hip  -     XR HIP LT W OR WO PELV 2-3 VWS; Future  -     REFERRAL TO PHYSICAL THERAPY        Below is the assessment and plan developed based on review of pertinent history, physical exam, labs, studies, and medications. Have discussed radiographic findings and answered all patient questions to her satisfaction. The patient is to continue DVT prophylaxis for one more week, then may discontinue. Have provided the patient with a prescription for outpatient physical therapy for range of motion, strengthening and gait training. The patient was asked to follow up in 8 weeks time for reevaluation with Dr Yolanda Momin, sooner should she develop any surgery related complications. The patient was asked to contact the office with any questions or concerns.  The patient understands and agrees to the treatment plan as outlined above. Return in about 2 months (around 1/29/2023) for post op follow up. Dr. Pastora Hamptons was available for immediate consultation as needed. An electronic signature was used to authenticate this note.   -- Joaquin Weaver PA-C

## 2022-12-07 ENCOUNTER — OFFICE VISIT (OUTPATIENT)
Dept: INTERNAL MEDICINE CLINIC | Age: 67
End: 2022-12-07
Payer: MEDICARE

## 2022-12-07 VITALS
TEMPERATURE: 97.6 F | SYSTOLIC BLOOD PRESSURE: 123 MMHG | BODY MASS INDEX: 27.68 KG/M2 | WEIGHT: 150.4 LBS | RESPIRATION RATE: 20 BRPM | DIASTOLIC BLOOD PRESSURE: 59 MMHG | HEIGHT: 62 IN

## 2022-12-07 DIAGNOSIS — F51.01 PRIMARY INSOMNIA: ICD-10-CM

## 2022-12-07 DIAGNOSIS — Z13.6 SCREENING FOR ISCHEMIC HEART DISEASE: ICD-10-CM

## 2022-12-07 DIAGNOSIS — R53.82 CHRONIC FATIGUE: ICD-10-CM

## 2022-12-07 DIAGNOSIS — Z00.00 MEDICARE ANNUAL WELLNESS VISIT, SUBSEQUENT: ICD-10-CM

## 2022-12-07 DIAGNOSIS — M16.12 PRIMARY OSTEOARTHRITIS OF LEFT HIP: ICD-10-CM

## 2022-12-07 DIAGNOSIS — Z12.31 ENCOUNTER FOR SCREENING MAMMOGRAM FOR MALIGNANT NEOPLASM OF BREAST: ICD-10-CM

## 2022-12-07 DIAGNOSIS — F33.1 MODERATE EPISODE OF RECURRENT MAJOR DEPRESSIVE DISORDER (HCC): Primary | ICD-10-CM

## 2022-12-07 PROCEDURE — G0463 HOSPITAL OUTPT CLINIC VISIT: HCPCS | Performed by: INTERNAL MEDICINE

## 2022-12-07 RX ORDER — ZOSTER VACCINE RECOMBINANT, ADJUVANTED 50 MCG/0.5
KIT INTRAMUSCULAR
Qty: 0.5 ML | Refills: 1 | Status: SHIPPED | OUTPATIENT
Start: 2022-12-07

## 2022-12-07 NOTE — PROGRESS NOTES
Chief Complaint   Patient presents with    Follow-up     2 month follow up       Visit Vitals  BP (!) 123/59   Temp 97.6 °F (36.4 °C)   Resp 20   Ht 5' 2\" (1.575 m)   Wt 150 lb 6.4 oz (68.2 kg)   BMI 27.51 kg/m²

## 2022-12-07 NOTE — PATIENT INSTRUCTIONS
Medicare Wellness Visit, Female     The best way to live healthy is to have a lifestyle where you eat a well-balanced diet, exercise regularly, limit alcohol use, and quit all forms of tobacco/nicotine, if applicable. Regular preventive services are another way to keep healthy. Preventive services (vaccines, screening tests, monitoring & exams) can help personalize your care plan, which helps you manage your own care. Screening tests can find health problems at the earliest stages, when they are easiest to treat. [facility] follows the current, evidence-based guidelines published by the [facility] ([facility]) when recommending preventive services for our patients. Because we follow these guidelines, sometimes recommendations change over time as research supports it. (For example, mammograms used to be recommended annually. Even though Medicare will still pay for an annual mammogram, the newer guidelines recommend a mammogram every two years for women of average risk). Of course, you and your doctor may decide to screen more often for some diseases, based on your risk and your co-morbidities (chronic disease you are already diagnosed with). Preventive services for you include:  - Medicare offers their members a free annual wellness visit, which is time for you and your primary care provider to discuss and plan for your preventive service needs.  Take advantage of this benefit every year!    -Over the age of 72 should receive the recommended pneumonia vaccines.    -All adults should have a flu vaccine yearly.  -All adults should have a tetanus vaccine every 10 years.   -Over the age 48 should receive the shingles vaccines.        -All adults should be screened once for Hepatitis C.  -All adults age 38-68 who are overweight should have a diabetes screening test once every three years.   -Other screening tests and preventive services for persons with diabetes include: an eye exam to screen for diabetic retinopathy, a kidney function test, a foot exam, and stricter control over your cholesterol.   -Cardiovascular screening for adults with routine risk involves an electrocardiogram (ECG) at intervals determined by your doctor.     -Colorectal cancer screenings should be done for adults age 39-70 with no increased risk factors for colorectal cancer. There are a number of acceptable methods of screening for this type of cancer. Each test has its own benefits and drawbacks. Discuss with your doctor what is most appropriate for you during your annual wellness visit. The different tests include: colonoscopy (considered the best screening method), a fecal occult blood test, a fecal DNA test, and sigmoidoscopy.    -Lung cancer screening is recommended annually with a low dose CT scan for adults between age 54 and 68, who have smoked at least 30 pack years (equivalent of 1 pack per day for 30 days), and who is a current smoker or quit less than 15 years ago.    -A bone mass density test is recommended when a woman turns 65 to screen for osteoporosis. This test is only recommended one time, as a screening. Some providers will use this same test as a disease monitoring tool if you already have osteoporosis. -Breast cancer screenings are recommended every other year for women of normal risk, age 54-69.    -Cervical cancer screenings for women over age 72 are only recommended with certain risk factors.      Here is a list of your current Health Maintenance items (your personalized list of preventive services) with a due date:  Health Maintenance Due   Topic Date Due    Hepatitis C Screening  Had Hep C, s/p successful tx    COVID-19 Vaccine (1) Completed    DTaP/Tdap/Td series (1 - Tdap) Prescription given    Shingrix Vaccine Age 50> (1 of 2) Never done - needs second dose    Breast Cancer Screen Mammogram  Ordered today    Lipid Screen  Ordered today    Medicare Yearly Exam 12/07/2023           Mucinex (plain not D or DM) 1200mg twice a day  Saline nasal spray 2 squirts each nostril 2-3 times daily.        Psychiatrist  Check with Dr. Mary Chaudhari group  [facility] psychiatry group  [address] [facility]

## 2022-12-07 NOTE — PROGRESS NOTES
HISTORY OF PRESENT ILLNESS  Jazmine Mackay is a 75 y.o. female. HPI  Seein as new patient 2 months ago. Long standing hx of depression, previously managed by psychiatirst.  Last visit increased Abilify to 20mg qhs. Continues on same dose Xanax 0.5mg qhs prn, Lexapro 20mg every day and Adderall 20mg bid. Has kory taking Mirtazapine 15mg qhs prn sleep. Remains very fatigued, not motivated. ? If something else going on. Exhaustion - started well before her hip replacement. No sob or chest pain. Non refreshed sleep. Had sleep study between 5-10 years ago showing mild CELESTE but not worth treating. Falls asleep during day. After grocery shopping needs to rest when gets home. Similar to other episodes of fatigue in the past.  Adderall used to help but not much anymore. No dz of chronic fatigue syndrome. Since last visit had left THR replacement. Doing great 5 weeks ago. Walking unassisted, \"barely a limp\". Off all pain meds. Current Outpatient Medications   Medication Instructions    ALPRAZolam (XANAX) 0.5 mg, Oral, BEDTIME PRN    ARIPiprazole (ABILIFY) 10 mg, Oral, DAILY    cholecalciferol (VITAMIN D3) 25 mcg, Oral, DAILY    dextroamphetamine-amphetamine (ADDERALL) 20 mg tablet 20 mg, Oral, 2 TIMES DAILY    diph,pertuss,acel,,tetanus vac,PF, (ADACEL) 2 Lf-(2.5-5-3-5 mcg)-5Lf/0.5 mL syrg vaccine 0.5 mL, IntraMUSCular, ONCE    escitalopram oxalate (LEXAPRO) 20 mg, DAILY    hydroCHLOROthiazide (HYDRODIURIL) 25 mg, DAILY    losartan (COZAAR) 100 mg, DAILY    mirtazapine (REMERON) 15 mg, Oral, AS NEEDED    naloxone (Narcan) 4 mg/actuation nasal spray Use 1 spray intranasally, then discard. Repeat with new spray every 2 min as needed for opioid overdose symptoms, alternating nostrils.     ondansetron (ZOFRAN ODT) 8 mg, Oral, EVERY 8 HOURS AS NEEDED    senna-docusate (PERICOLACE) 8.6-50 mg per tablet 1 Tablet, Oral, 2 TIMES DAILY    varicella-zoster recombinant, PF, (Shingrix, PF,) 50 mcg/0.5 mL susr injection 0.5mL by IntraMUSCular route once now and then repeat in 2-6 months       Visit Vitals  BP (!) 123/59   Temp 97.6 °F (36.4 °C)   Resp 20   Ht 5' 2\" (1.575 m)   Wt 150 lb 6.4 oz (68.2 kg)   BMI 27.51 kg/m²       ROS  See above  Physical Exam  Vitals reviewed. Constitutional:       Appearance: Normal appearance. HENT:      Head: Normocephalic and atraumatic. Nose: Congestion and rhinorrhea (clear) present. Mouth/Throat:      Mouth: Mucous membranes are moist.      Comments: Post nasal drainage  Neck:      Vascular: No carotid bruit. Comments: No TM  Cardiovascular:      Rate and Rhythm: Normal rate and regular rhythm. Pulses: Normal pulses. Heart sounds: Normal heart sounds. Pulmonary:      Effort: Pulmonary effort is normal.      Breath sounds: Normal breath sounds. Abdominal:      General: Abdomen is flat. There is no distension. Palpations: Abdomen is soft. There is no mass. Tenderness: There is no abdominal tenderness. Musculoskeletal:      Cervical back: Neck supple. Lymphadenopathy:      Cervical: No cervical adenopathy. Neurological:      Mental Status: She is alert. ASSESSMENT and PLAN  Diagnoses and all orders for this visit:    1. Moderate episode of recurrent major depressive disorder (HCC) - not controlled and contributing to fatigue. Referred back to psychiatry. Had seen Dr. Nuzhat Washington in past prior to his custodial. Continue Ability 20mg qhs, Adderall 20mg bid, Lexapro 20mg every day and Xanax 0.5mg prn    2. Chronic fatigue - Most likely secondary to above but ? CELESTE. Referred to sleep medicine.    -     SLEEP MEDICINE REFERRAL    3. Primary insomnia - continues despite meds. Again, sleep consult as above. Continue Mirtazapine 15mg qhs    4. Primary osteoarthritis of left hip - doing well 5 weeks s/p L THR. Continue PT    5. Medicare annual wellness visit, subsequent    6.  Screening for ischemic heart disease  -     LIPID PANEL; Future    7. Encounter for screening mammogram for malignant neoplasm of breast  -     [facility] MAMMO BI SCREENING INCL CAD; Future    Other orders  -     varicella-zoster recombinant, PF, (Shingrix, PF,) 50 mcg/0.5 mL susr injection; 0.5mL by IntraMUSCular route once now and then repeat in 2-6 months  -     diph,pertuss,acel,,tetanus vac,PF, (ADACEL) 2 Lf-(2.5-5-3-5 mcg)-5Lf/0.5 mL syrg vaccine; 0.5 mL by IntraMUSCular route once for 1 dose. 'This is the Subsequent Medicare Annual Wellness Exam, performed 12 months or more after the Initial AWV or the last Subsequent AWV    I have reviewed the patient's medical history in detail and updated the computerized patient record. Assessment/Plan   Education and counseling provided:  Are appropriate based on today's review and evaluation  Advanced directive discussed with patient. 1. Moderate episode of recurrent major depressive disorder ([address]  2. Chronic fatigue  -     SLEEP MEDICINE REFERRAL  3. Primary insomnia  4. Primary osteoarthritis of left hip  5. Medicare annual wellness visit, subsequent  6. Screening for ischemic heart disease  -     LIPID PANEL; Future  7. Encounter for screening mammogram for malignant neoplasm of breast  -     [facility] MAMMO BI SCREENING INCL CAD;  Future       Depression Risk Factor Screening     3 most recent PHQ Screens 12/7/2022   Little interest or pleasure in doing things Not at all   Feeling down, depressed, irritable, or hopeless Not at all   Total Score PHQ 2 0   Trouble falling or staying asleep, or sleeping too much -   Feeling tired or having little energy -   Poor appetite, weight loss, or overeating -   Feeling bad about yourself - or that you are a failure or have let yourself or your family down -   Trouble concentrating on things such as school, work, reading, or watching TV -   Moving or speaking so slowly that other people could have noticed; or the opposite being so fidgety that others notice -   Thoughts of being better off dead, or hurting yourself in some way -   PHQ 9 Score -   How difficult have these problems made it for you to do your work, take care of your home and get along with others -       Alcohol & Drug Abuse Risk Screen    Do you average more than 1 drink per night or more than 7 drinks a week:  No    On any one occasion in the past three months have you have had more than 3 drinks containing alcohol:  No          Functional Ability and Level of Safety    Hearing: Hearing is good. Activities of Daily Living: The home contains: no safety equipment. Patient does total self care      Ambulation: with no difficulty     Fall Risk:  Fall Risk Assessment, last 12 mths 12/7/2022   Able to walk? Yes   Fall in past 12 months? 0   Do you feel unsteady? 0   Are you worried about falling 0   Number of falls in past 12 months -   Fall with injury?  -      Abuse Screen:  Patient is not abused       Cognitive Screening    Has your family/caregiver stated any concerns about your memory: no     Cognitive Screening: Normal -      Health Maintenance Due     Health Maintenance Due   Topic Date Due    Hepatitis C Screening  Had Hep C, s/p successful tx    COVID-19 Vaccine (1) Completed    DTaP/Tdap/Td series (1 - Tdap) Prescription given    Shingrix Vaccine Age 49> (1 of 2) Never done - needs second dose    Breast Cancer Screen Mammogram  Ordered today    Lipid Screen  Ordered today    Medicare Yearly Exam  12/07/2023       Patient Care Team   Patient Care Team:  Rafael Cleaning MD as PCP - General (Internal Medicine Physician)  Rafael Cleaning MD as PCP - [facility] Empaneled Provider    History     Patient Active Problem List   Diagnosis Code    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     Past Medical History:   Diagnosis Date    Attention deficit disorder     Hepatitis C     Hypertension     Nausea & vomiting     Other ill-defined conditions(799.89)     hep c- treated 2002    Other ill-defined conditions(799.89)     ADD Other ill-defined conditions(089.89)     depression    Unspecified adverse effect of anesthesia     delayed awakening       Past Surgical History:   Procedure Laterality Date    HX OTHER SURGICAL  1981    right inguinal lymph node removed- benign    HX OTHER SURGICAL  1990s    exploratoey lap for adhesions     Current Outpatient Medications   Medication Sig Dispense Refill    varicella-zoster recombinant, PF, (Shingrix, PF,) 50 mcg/0.5 mL susr injection 0.5mL by IntraMUSCular route once now and then repeat in 2-6 months 0.5 mL 1    diph,pertuss,acel,,tetanus vac,PF, (ADACEL) 2 Lf-(2.5-5-3-5 mcg)-5Lf/0.5 mL syrg vaccine 0.5 mL by IntraMUSCular route once for 1 dose. 0.5 mL 0    ondansetron (ZOFRAN ODT) 8 mg disintegrating tablet Take 1 Tablet by mouth every eight (8) hours as needed for Nausea or Vomiting. 12 Tablet 0    senna-docusate (PERICOLACE) 8.6-50 mg per tablet Take 1 Tablet by mouth two (2) times a day. Indications: constipation, opioid induced constipation 50 Tablet 0    naloxone (Narcan) 4 mg/actuation nasal spray Use 1 spray intranasally, then discard. Repeat with new spray every 2 min as needed for opioid overdose symptoms, alternating nostrils. Indications: opioid overdose, decrease in rate & depth of breathing due to opioid drug 1 Each 0    ARIPiprazole (ABILIFY) 10 mg tablet Take 1 Tablet by mouth daily. 30 Tablet 5    dextroamphetamine-amphetamine (ADDERALL) 20 mg tablet Take 20 mg by mouth two (2) times a day. cholecalciferol (VITAMIN D3) 25 mcg (1,000 unit) cap Take 25 mcg by mouth daily. mirtazapine (REMERON) 15 mg tablet Take 15 mg by mouth as needed. losartan (COZAAR) 100 mg tablet Take 100 mg by mouth daily. hydrochlorothiazide (HYDRODIURIL) 25 mg tablet Take 25 mg by mouth daily. escitalopram oxalate (LEXAPRO) 20 mg tablet Take 20 mg by mouth daily. ALPRAZolam (XANAX) 0.5 mg tablet Take 0.5 mg by mouth nightly as needed.        Allergies   Allergen Reactions Gabapentin Other (comments)     Edema     Lisinopril Cough       Family History   Problem Relation Age of Onset    Hypertension Mother     Lung Disease Mother     Hypertension Father     Heart Disease Father 39        MI    Cancer Father         head and neck cancer    Hypertension Sister      Social History     Tobacco Use    Smoking status: Former     Years: 0.50     Types: Cigarettes     Quit date: 10/1/2020     Years since quittin.1    Smokeless tobacco: Never    Tobacco comments:     social smoking   Substance Use Topics    Alcohol use:  Yes     Alcohol/week: 11.7 standard drinks     Types: 14 Glasses of wine per week         New Bennett MD

## 2022-12-08 ENCOUNTER — OFFICE VISIT (OUTPATIENT)
Dept: ORTHOPEDIC SURGERY | Age: 67
End: 2022-12-08
Payer: MEDICARE

## 2022-12-08 DIAGNOSIS — R26.2 DIFFICULTY WALKING: ICD-10-CM

## 2022-12-08 DIAGNOSIS — M25.552 LEFT HIP PAIN: Primary | ICD-10-CM

## 2022-12-08 LAB
CHOLEST SERPL-MCNC: 208 MG/DL
HDLC SERPL-MCNC: 69 MG/DL
HDLC SERPL: 3 {RATIO} (ref 0–5)
LDLC SERPL CALC-MCNC: 117.4 MG/DL (ref 0–100)
TRIGL SERPL-MCNC: 108 MG/DL (ref ?–150)
VLDLC SERPL CALC-MCNC: 21.6 MG/DL

## 2022-12-08 PROCEDURE — 97161 PT EVAL LOW COMPLEX 20 MIN: CPT | Performed by: PHYSICAL THERAPIST

## 2022-12-08 PROCEDURE — 97110 THERAPEUTIC EXERCISES: CPT | Performed by: PHYSICAL THERAPIST

## 2022-12-08 NOTE — PROGRESS NOTES
PHYSICAL THERAPY EVALUATION  Patient Name: Tamiko Castro  Date:2022  : 1955  [x]  Patient  Verified  Payor: Viky Hernandez / Plan: [place] MEDICARE PART A & B / Product Type: Medicare /    Total Treatment Time (min): 60 minutes  Referring physician: Toby Apple  Visit #: 1      HISTORY OF PRESENT ILLNESS: Patient is a 79-year-old female for to physical therapy for evaluation and treatment of her left hip. Patient reports approximate 1 year history of left hip pain. She underwent a left anterior total hip replacement on . She had subsequent home health physical therapy without issues. She is currently walking without assistive device. Currently at rest she rates her pain at a 0 out of 10 and states will increase to a 5 out of 10 at its highest.  Symptoms are localized to the lateral aspect of her hip and are described as a deep dull ache. Symptoms are worsened with walking prolonged periods and going up steps. Symptoms are improved with Tylenol. She denies any recent falls    Functional impairments: Difficulty going up steps    Past Medical Hx: Hypertension well-controlled, stenosis    Social history: Retired. Lives with  with 5 steps to enter home    OBJECTIVE    Gait/General observation: Arrives without assistive device. There is decreased hip extension on the left when compared to the right. Incision healing well without signs or symptoms of infection    Functional tests: Able to achieve and maintain single limb stance on the right for approximately 15 seconds. Able to achieve and maintain single limb stance on the left for approximately 10 seconds. Demonstrates quad dominant squat pattern with slight weight shift to the right as well as bilateral heel cord tightness. Palpation: Tender over anterior hip    Range of motion: 80 degrees of hip flexion on the left, 15 degrees of internal and external rotation.   Extension to neutral    Strength: Able to perform adequate quad set    Flexibility: Mild hamstring restrictions bilaterally    Special tests: Negative Homans     LEFS: 42 out of 80    Treatment: Therapeutic exercise: 15 minutes    PT Exercise Log        Activity/Exercise Date  12/08/22    Activity/Exercise      NuStep   X      Heel slides X     Bridges   X       Clamshells supine X     Slant X     Balance X                         Prognosis: Good    ASSESSMENT:    Patient presents with the Above impairments secondary to left total hip replacement. Patient will benefit from skilled outpatient physical therapy services to address above limitations and maximize function. Short-Term Goals (1 weeks)  1. Patient will be independent and compliant with home exercise program to facilitate recovery. Long-Term Goals (4-6 weeks)  1. Patient will report improvement in pain by at least 25%. 2. Patient will increase score on LEFS at least 9 points  3. Patient will walk at least 2 miles over unlevel surfaces    PLAN:  1-2x weekly. Duration 20 visits. Interventions to include but are not limited to joint mobilizations, soft tissue mobilization, myofascial release, therapeutic exercise, neuromuscular reeducation, and modalities as indicated.

## 2022-12-14 ENCOUNTER — OFFICE VISIT (OUTPATIENT)
Dept: ORTHOPEDIC SURGERY | Age: 67
End: 2022-12-14
Payer: MEDICARE

## 2022-12-14 DIAGNOSIS — M25.552 LEFT HIP PAIN: Primary | ICD-10-CM

## 2022-12-14 DIAGNOSIS — R26.2 DIFFICULTY WALKING: ICD-10-CM

## 2022-12-14 PROCEDURE — 97110 THERAPEUTIC EXERCISES: CPT | Performed by: PHYSICAL THERAPIST

## 2022-12-14 PROCEDURE — 97140 MANUAL THERAPY 1/> REGIONS: CPT | Performed by: PHYSICAL THERAPIST

## 2022-12-15 ENCOUNTER — OFFICE VISIT (OUTPATIENT)
Dept: SLEEP MEDICINE | Age: 67
End: 2022-12-15
Payer: MEDICARE

## 2022-12-15 VITALS
HEIGHT: 62 IN | DIASTOLIC BLOOD PRESSURE: 56 MMHG | HEART RATE: 73 BPM | TEMPERATURE: 98.2 F | WEIGHT: 148 LBS | BODY MASS INDEX: 27.23 KG/M2 | OXYGEN SATURATION: 94 % | SYSTOLIC BLOOD PRESSURE: 102 MMHG

## 2022-12-15 DIAGNOSIS — G47.33 OBSTRUCTIVE SLEEP APNEA (ADULT) (PEDIATRIC): Primary | ICD-10-CM

## 2022-12-15 DIAGNOSIS — G47.00 INSOMNIA, UNSPECIFIED TYPE: ICD-10-CM

## 2022-12-15 NOTE — PROGRESS NOTES
PT DAILY TREATMENT NOTE    Patient Name: Tamiko Castro    : 1955  [x]  Patient  Verified  Payor: VA MEDICARE / Plan: [place] MEDICARE PART A & B / Product Type: Medicare /    Total Treatment Time (min): 60  Referring Physician: Nelwyn Schirmer, MD    1. Left hip pain      2. Difficulty walking         ICD-10-CM ICD-9-CM    1. Left hip pain  M25.552 719.45       2. Difficulty walking  R26.2 719.7           SUBJECTIVE  Subjective functional status/changes:   [] No changes reported    Patient reports compliance of home exercise program.  Notes improved gait and overall function    OBJECTIVE/TREATMENT    Reviewed home exercise program.  Improved gait today with equal step lengths    Manual Therapy x 15 mins: Passive hip mobility all planes. Soft tissue work to incision and anterior hip in supine. Gentle hip flexor stretching in modified Dameon test position. Therapeutic Exercise x 30 mins:   Strengthening/Endurance/ADL function/Neuromuscular reeducation activities/exercises supervised and completed as listed below.     EXERCISE 12/15/2022   NuStep X   Slant X   Balance X   Bridging X   Clams X   Medial lateral gait with band X                                                           Added/Changed Exercises:  []  Advanced to address: [x] functional strength/ROM deficits [] balance/proprioceptive tasks  []  Modified: [] per subjective reports [] for patient time constraints [] for clinic time constraints    Modality:  []  E-Stim: type _ x _ min     []att   []unatt   []w/ice   []w/heat  []  Ultrasound: []cont   []pulse    _ W/cm2 x _  min   []1MHz   []3MHz  [x]  Ice pack: post      []  Hot pack: pre    []  Other:     Neuromuscular Re-education Neuromuscular reeducation of movement, balance, coordination, posture, and/or proprioception for sitting or standing activities:  minutes    []  Kinesiotaping for   []  Neuromuscular reeducation to the VMO with use of [place] electrical stimulation in conjunction with active contraction and exercises. []  Balance/proprioceptive exercises and activities in clinic as listed on flow sheet above. Patient Education: [x] Review HEP    [] Progressed/Changed HEP based on:  [] positioning   [] body mechanics   [] transfers   [] heat/ice application      ASSESSMENT    Improved gait today. Still lacks hip extension to neutral on the left.   We will continue per plan of care    Progress towards goals / Updated goals:    PLAN  []  Upgrade activities as tolerated      [x]  Continue plan of care  []  Discharge due to:_  [] Other:_       Kathy Aragon, PT 12/15/2022

## 2022-12-15 NOTE — PROGRESS NOTES
217 [facility] [place]. [place], [address]  Tel.  [phone]  Fax. 100 [facility] 60  [place], [address] [facility]  Tel.  [phone]  Fax. [phone] [address]  Tel.  [phone]  Fax. [phone]         Subjective:      David Nicohle is an 75 y.o. female referred for evaluation for a sleep disorder. She complains of excessive daytime sleepiness, fatigue associated with previous diagnosis of sleep apnea (but not severe enough for PAP therapy) about 5-7 years ago. Symptoms began a few years ago, gradually worsening since that time. She usually can fall asleep in 30 minutes. Family or house members note snoring, not aware of pauses in breathing since she has lost weight. She denies falling asleep while driving. David Nichole does wake up frequently at night. She is bothered by waking up too early and left unable to get back to sleep. She actually sleeps about 8 hours at night and wakes up about 2 times during the night. She does not work shifts:  . Inocente Koch indicates she does get too little sleep at night. Her bedtime is 2030. She awakens at 6-9 am  . She does take naps. She takes 4 naps a week lasting 2, Hour(s). She has the following observed behaviors: Pauses in breathing;  . 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 , Disp: 30 Tablet, Rfl: 5    dextroamphetamine-amphetamine (ADDERALL) 20 mg tablet, Take 20 mg by mouth two (2) times a day., Disp: , Rfl:     cholecalciferol (VITAMIN D3) 25 mcg (1,000 unit) cap, Take 25 mcg by mouth daily. , Disp: , Rfl:     mirtazapine (REMERON) 15 mg tablet, Take 15 mg by mouth as needed. , Disp: , Rfl:     losartan (COZAAR) 100 mg tablet, Take 100 mg by mouth daily. , Disp: , Rfl:     hydrochlorothiazide (HYDRODIURIL) 25 mg tablet, Take 25 mg by mouth daily. , Disp: , Rfl:     escitalopram oxalate (LEXAPRO) 20 mg tablet, Take 20 mg by mouth daily. , Disp: , Rfl:      She  has a past medical history of Attention deficit disorder, Hepatitis C, Hypertension, Nausea & vomiting, Other ill-defined conditions(799.89), Other ill-defined conditions(799.89), Other ill-defined conditions(799.89), and Unspecified adverse effect of anesthesia. She  has a past surgical history that includes hx other surgical (1981) and hx other surgical (1990s). She family history includes Cancer in her father; Heart Disease (age of onset: 39) in her father; Hypertension in her father, mother, and sister; Lung Disease in her mother. She  reports that she quit smoking about 2 years ago. Her smoking use included cigarettes. She has never used smokeless tobacco. She reports current alcohol use of about 11.7 standard drinks per week. She reports that she does not use drugs. Review of Systems:  Constitutional:  20 pound weight loss  Eyes:  No blurred vision. CVS:  No significant chest pain  Pulm:  No significant shortness of breath, she is just getting over a cold  GI:  No significant nausea or vomiting  :  No significant nocturia  Musculoskeletal:  mild deep hip joint pain at night (recently had her hip replaced)  Skin:  No significant rashes  Neuro:  No significant dizziness   Psych: treated for depression,ADD. She recently stopped her adderall (a few days ago) and doesn't feel much different.  She plans on seeing her neuropsychiatrist to address her depression to optimize her therapy    Sleep Review of Systems: notable for occasional difficulty falling asleep; +frequent awakenings at night;  some dreaming noted; no nightmares ; no early morning headaches; no memory problems; no significant concentration issues       Objective:   Visit Vitals  BP (!) 102/56 (BP 1 Location: Right upper arm, BP Patient Position: Sitting)   Pulse 73   Temp 98.2 °F (36.8 °C)   Ht 5' 2\" (1.575 m)   Wt 148 lb (67.1 kg)   SpO2 94%   BMI 27.07 kg/m²         General:   Not in acute distress   Eyes:  Anicteric sclerae, no obvious strabismus   Nose:  No obvious nasal septum deviation    Oropharynx:   Class 3 oropharyngeal outlet, thick tongue base,  , low-lying soft palate, narrow tonsilo-pharyngeal pilars   Tonsils:   tonsils are present and normal   Neck:    ; midline trachea   Chest/Lungs:  Equal lung expansion, clear on auscultation    CVS:  Normal rate, regular rhythm; no JVD   Skin:  Warm to touch; no obvious rashes   Neuro:  No focal deficits ; no obvious tremor    Psych:  Normal affect,  normal countenance;          Assessment:       ICD-10-CM ICD-9-CM    1. Obstructive sleep apnea (adult) (pediatric)  G47.33 327.23 POLYSOMNOGRAPHY 1 NIGHT      2. Insomnia, unspecified type  G47.00 780.52             Plan:     * The patient currently has a High Risk for having sleep apnea. STOP-BANG score 5.  * PSG was ordered for initial evaluation. We will follow the American Academy of Sleep Medicine protocol regarding split-night procedures and offering a trial of Positive Airway Pressure (CPAP, BPAP, etc.)  She is interested in knowing about her sleep quality. . She will follow up with her psychiatrist to esure that her depression treatment is optimized. Treatment options for sleep apnea were reviewed. She is agreeable to PAP treatment if found to have significant apnea  * She was provided information on sleep apnea including coresponding risk factors and the importance of proper treatment. * Counseling was provided regarding proper sleep hygiene and safe driving.      2. Insomnia /Inadequate sleep hygiene-  she is spending too much time in bed. I have advised a regular sleep wake cycle. she  was advised to avoid looking at the clock during the night. She will remain off the adderall as it did not seem to make much difference. Fatigue has not changed over last few days. she was advised to minimize caffeine use and to avoid caffeine-containing beverages 8 hours prior to bedtime. She will check in with her neuropsychiatrist to ensure depression optimally controlled. A regular exercise schedule, at least 3 hours before bedtime, would be beneficial to improving sleep quality. Watching TV, using laptops, tablets and smartphones in the evening was discouraged. she  was advised to keep the bedroom cool and dark. .  All of her questions were addressed. The treatment plan was reviewed with the patient in detail . she understands that the lead technologist will be calling her  with the results or notifying of results via [address] [place] [address] and assisting with the next step in the treatment plan as outlined today during the consultation with me. All of her questions were addressed. Thank you for allowing us to participate in your patient's medical care. We'll keep you updated on these investigations.   Electronically signed by    Marquetta Gosselin, MD  Diplomate in Sleep Medicine  [facility]  12/15/2022

## 2022-12-15 NOTE — PATIENT INSTRUCTIONS
[address] [facility] [address] [facility] [address] [place]. [place], [address]  Tel.  [phone]  Fax. 100 [facility] 60  [place], [address] [facility]  Tel.  [phone]  Fax. [phone] [address]  Tel.  [phone]  Fax. [phone]     Sleep Apnea: After Your Visit  Your Care Instructions  Sleep apnea occurs when you frequently stop breathing for 10 seconds or longer during sleep. It can be mild to severe, based on the number of times per hour that you stop breathing or have slowed breathing. Blocked or narrowed airways in your nose, mouth, or throat can cause sleep apnea. Your airway can become blocked when your throat muscles and tongue relax during sleep. Sleep apnea is common, occurring in 1 out of 20 individuals. Individuals having any of the following characteristics should be evaluated and treated right away due to high risk and detrimental consequences from untreated sleep apnea:  Obesity  Congestive Heart failure  Atrial Fibrillation  Uncontrolled Hypertension  Type II Diabetes  Night-time Arrhythmias  Stroke  Pulmonary Hypertension  High-risk Driving Populations (pilots, truck drivers, etc.)  Patients Considering Weight-loss Surgery    How do you know you have sleep apnea? You probably have sleep apnea if you answer 'yes' to 3 or more of the following questions:  S - Have you been told that you Snore? T - Are you often Tired during the day? O - Has anyone Observed you stop breathing while sleeping? P- Do you have (or are being treated for) high blood Pressure? B - Are you obese (Body Mass Index > 35)? A - Is your Age 48years old or older? N - Is your Neck size greater than 16 inches? G - Are you male Gender? A sleep physician can prescribe a breathing device that prevents tissues in the throat from blocking your airway. Or your doctor may recommend using a dental device (oral breathing device) to help keep your airway open.  In some cases, surgery may be needed to remove enlarged tissues in the throat. Follow-up care is a key part of your treatment and safety. Be sure to make and go to all appointments, and call your doctor if you are having problems. It's also a good idea to know your test results and keep a list of the medicines you take. How can you care for yourself at home? Lose weight, if needed. It may reduce the number of times you stop breathing or have slowed breathing. Go to bed at the same time every night. Sleep on your side. It may stop mild apnea. If you tend to roll onto your back, sew a pocket in the back of your pa[employer]und Exchange top. Put a tennis ball into the pocket, and stitch the pocket shut. This will help keep you from sleeping on your back. Avoid alcohol and medicines such as sleeping pills and sedatives before bed. Do not smoke. Smoking can make sleep apnea worse. If you need help quitting, talk to your doctor about stop-smoking programs and medicines. These can increase your chances of quitting for good. Prop up the head of your bed 4 to 6 inches by putting bricks under the legs of the bed. Treat breathing problems, such as a stuffy nose, caused by a cold or allergies. Use a continuous positive airway pressure (CPAP) breathing machine if lifestyle changes do not help your apnea and your doctor recommends it. The machine keeps your airway from closing when you sleep. If CPAP does not help you, ask your doctor whether you should try other breathing machines. A bilevel positive airway pressure machine has two types of air pressureâone for breathing in and one for breathing out. Another device raises or lowers air pressure as needed while you breathe. If your nose feels dry or bleeds when using one of these machines, talk with your doctor about increasing moisture in the air. A humidifier may help.   If your nose is runny or stuffy from using a breathing machine, talk with your doctor about using decongestants or a corticosteroid nasal spray.  When should you call for help? Watch closely for changes in your health, and be sure to contact your doctor if:  You still have sleep apnea even though you have made lifestyle changes. You are thinking of trying a device such as CPAP. You are having problems using a CPAP or similar machine. Where can you learn more? Go to [employer]. Enter O763 in the search box to learn more about \"Sleep Apnea: After Your Visit. \"   © 9285-5301 Healthwise, Incorporated. Care instructions adapted under license by [facility] (which disclaims liability or warranty for this information). This care instruction is for use with your licensed healthcare professional. If you have questions about a medical condition or this instruction, always ask your healthcare professional. [employer] any warranty or liability for your use of this information. PROPER SLEEP HYGIENE    What to avoid  Do not have drinks with caffeine, such as coffee or black tea, for 8 hours before bed. Do not smoke or use other types of tobacco near bedtime. Nicotine is a stimulant and can keep you awake. Avoid drinking alcohol late in the evening, because it can cause you to wake in the middle of the night. Do not eat a big meal close to bedtime. If you are hungry, eat a light snack. Do not drink a lot of water close to bedtime, because the need to urinate may wake you up during the night. Do not read or watch TV in bed. Use the bed only for sleeping and sexual activity. What to try  Go to bed at the same time every night, and wake up at the same time every morning. Do not take naps during the day. Keep your bedroom quiet, dark, and cool. Get regular exercise, but not within 3 to 4 hours of your bedtime. .  Sleep on a comfortable pillow and mattress. If watching the clock makes you anxious, turn it facing away from you so you cannot see the time.   If you worry when you lie down, start a worry book. Well before bedtime, write down your worries, and then set the book and your concerns aside. Try meditation or other relaxation techniques before you go to bed. If you cannot fall asleep, get up and go to another room until you feel sleepy. Do something relaxing. Repeat your bedtime routine before you go to bed again. Make your house quiet and calm about an hour before bedtime. Turn down the lights, turn off the TV, log off the computer, and turn down the volume on music. This can help you relax after a busy day. Drowsy Driving  The [address] [place] [address] Road Traffic Safety Administration cites drowsiness as a causing factor in more than 699,980 police reported crashes annually, resulting in 76,000 injuries and 1,500 deaths. Other surveys suggest 55% of people polled have driven while drowsy in the past year, 23% had fallen asleep but not crashed, 3% crashed, and 2% had and accident due to drowsy driving. Who is at risk? Young Drivers: One study of drowsy driving accidents states that 55% of the drivers were under 25 years. Of those, 75% were male. Shift Workers and Travelers: People who work overnight or travel across time zones frequently are at higher risk of experiencing Circadian Rhythm Disorders. They are trying to work and function when their body is programed to sleep. Sleep Deprived: Lack of sleep has a serious impact on your ability to pay attention or focus on a task. Consistently getting less than the average of 8 hours your body needs creates partial or cumulative sleep deprivation. Untreated Sleep Disorders: Sleep Apnea, Narcolepsy, R.L.S., and other sleep disorders (untreated) prevent a person from getting enough restful sleep. This leads to excessive daytime sleepiness and increases the risk for drowsy driving accidents by up to 7 times. Medications / Alcohol: Even over the counter medications can cause drowsiness.  Medications that impair a drivers attention should have a warning label. Alcohol naturally makes you sleepy and on its own can cause accidents. Combined with excessive drowsiness its effects are amplified. Signs of Drowsy Driving:   * You don't remember driving the last few miles   * You may drift out of your faith   * You are unable to focus and your thoughts wander   * You may yawn more often than normal   * You have difficulty keeping your eyes open / nodding off   * Missing traffic signs, speeding, or tailgating  Prevention-   Good sleep hygiene, lifestyle and behavioral choices have the most impact on drowsy driving. There is no substitute for sleep and the average person requires 8 hours nightly. If you find yourself driving drowsy, stop and sleep. Consider the sleep hygiene tips provided during your visit as well. Medication Refill Policy: Refills for all medications require 1 week advance notice. Please have your pharmacy fax a refill request. We are unable to fax, or call in \"controled substance\" medications and you will need to pick these prescriptions up from our office. [employer] Activation    Thank you for requesting access to [employer]. Please follow the instructions below to securely access and download your online medical record. [employer] allows you to send messages to your doctor, view your test results, renew your prescriptions, schedule appointments, and more. How Do I Sign Up? In your internet browser, go to [email or web address]. Click on the First Time User? Click Here link in the Sign In box. You will see the New Member Sign Up page. Enter your [employer] Access Code exactly as it appears below. You will not need to use this code after youve completed the sign-up process. If you do not sign up before the expiration date, you must request a new code. [employer] Access Code:  Activation code not generated  Current [employer] Status: Active (This is the date your [employer] access code will )    Enter the last four digits of your Social Security Number (xxxx) and Date of Birth (mm/dd/yyyy) as indicated and click Submit. You will be taken to the next sign-up page. Create a Hortonworks ID. This will be your Hortonworks login ID and cannot be changed, so think of one that is secure and easy to remember. Create a [employer]works password. You can change your password at any time. Enter your Password Reset Question and Answer. This can be used at a later time if you forget your password. Enter your e-mail address. You will receive e-mail notification when new information is available in [address] Click Sign Up. You can now view and download portions of your medical record. Click the Bloxy link to download a portable copy of your medical information. Additional Information    If you have questions, please call [phone]. Remember, [employer] is NOT to be used for urgent needs. For medical emergencies, dial 911.

## 2023-01-11 ENCOUNTER — HOSPITAL ENCOUNTER (OUTPATIENT)
Dept: MAMMOGRAPHY | Age: 68
Discharge: HOME | End: 2023-01-11
Attending: INTERNAL MEDICINE
Payer: MEDICARE

## 2023-01-11 DIAGNOSIS — Z12.31 ENCOUNTER FOR SCREENING MAMMOGRAM FOR MALIGNANT NEOPLASM OF BREAST: ICD-10-CM

## 2023-01-11 PROCEDURE — 77067 SCR MAMMO BI INCL CAD: CPT

## 2023-01-31 ENCOUNTER — OFFICE VISIT (OUTPATIENT)
Dept: ORTHOPEDIC SURGERY | Age: 68
End: 2023-01-31
Payer: MEDICARE

## 2023-01-31 DIAGNOSIS — Z96.642 STATUS POST TOTAL REPLACEMENT OF LEFT HIP: Primary | ICD-10-CM

## 2023-01-31 PROCEDURE — 99024 POSTOP FOLLOW-UP VISIT: CPT | Performed by: ORTHOPAEDIC SURGERY

## 2023-01-31 NOTE — PROGRESS NOTES
Abdullahi Wild (: 1955) is a 75 y.o. female, patient, here for evaluation of the following chief complaint(s):  Surgical Follow-up (Second post op follow up - LTHA 2022 at [facility] )       HPI:    Doing well post left total hip. Some mild discomfort at night. Otherwise walking normally and doing everything. Allergies   Allergen Reactions    Gabapentin Other (comments)     Edema     Lisinopril Cough       Current Outpatient Medications   Medication Sig    varicella-zoster recombinant, PF, (Shingrix, PF,) 50 mcg/0.5 mL susr injection 0.5mL by IntraMUSCular route once now and then repeat in 2-6 months    ARIPiprazole (ABILIFY) 10 mg tablet Take 1 Tablet by mouth daily. dextroamphetamine-amphetamine (ADDERALL) 20 mg tablet Take 20 mg by mouth two (2) times a day. cholecalciferol (VITAMIN D3) 25 mcg (1,000 unit) cap Take 25 mcg by mouth daily. mirtazapine (REMERON) 15 mg tablet Take 15 mg by mouth as needed. losartan (COZAAR) 100 mg tablet Take 100 mg by mouth daily. hydrochlorothiazide (HYDRODIURIL) 25 mg tablet Take 25 mg by mouth daily. escitalopram oxalate (LEXAPRO) 20 mg tablet Take 20 mg by mouth daily. No current facility-administered medications for this visit.        Past Medical History:   Diagnosis Date    Attention deficit disorder     Hepatitis C     Hypertension     Nausea & vomiting     Other ill-defined conditions(799.89)     hep c- treated     Other ill-defined conditions(799.89)     ADD    Other ill-defined conditions(799.89)     depression    Unspecified adverse effect of anesthesia     delayed awakening         Past Surgical History:   Procedure Laterality Date    HX OTHER SURGICAL      right inguinal lymph node removed- benign    HX OTHER SURGICAL      exploratoey lap for adhesions       Family History   Problem Relation Age of Onset    Hypertension Mother     Lung Disease Mother     Hypertension Sister     Breast Cancer Paternal Grandmother Hypertension Father     Heart Disease Father 39        MI    Cancer Father         head and neck cancer        Social History     Socioeconomic History    Marital status:      Spouse name: Not on file    Number of children: Not on file    Years of education: Not on file    Highest education level: Not on file   Occupational History    Not on file   Tobacco Use    Smoking status: Former     Years: 0.50     Types: Cigarettes     Quit date: 10/1/2020     Years since quittin.3    Smokeless tobacco: Never    Tobacco comments:     social smoking   Vaping Use    Vaping Use: Never used   Substance and Sexual Activity    Alcohol use: Yes     Alcohol/week: 11.7 standard drinks     Types: 14 Glasses of wine per week    Drug use: No    Sexual activity: Not on file   Other Topics Concern    Not on file   Social History Narrative    Not on file     Social Determinants of Health     Financial Resource Strain: Not on file   Food Insecurity: Not on file   Transportation Needs: Not on file   Physical Activity: Not on file   Stress: Not on file   Social Connections: Not on file   Intimate Partner Violence: Not on file   Housing Stability: Not on file             Vitals: There were no vitals taken for this visit. There is no height or weight on file to calculate BMI. PHYSICAL EXAM:  On physical examination incisions well-healed. Walks normally. She has a slight hip flexion contracture may be 2 to 3 degrees compared to her nonoperative side. IMAGING:  No new films today. ASSESSMENT/PLAN:  1. Status post total replacement of left hip  Doing great post left total hip. Home exercise program to try to stretch out her anterior hip. This should help with her overall function. She has developed a slight flexion contracture. Otherwise she is pretty much back to normal.    She and I discussed her shoulder arthritis and I have referred her to Dr. Shanell Friend for consideration of a shoulder replacement.     An electronic signature was used to authenticate this note.   --Jovon Collins MD

## 2023-01-31 NOTE — LETTER
1/31/2023    Patient: lEiza Goodrich   YOB: 1955   Date of Visit: 1/31/2023     Singh Jose MD  [address]  [facility]    Dear Singh Jose MD,      Thank you for referring Ms. Domi Delvalle to [facility] for evaluation. My notes for this consultation are attached. If you have questions, please do not hesitate to call me. I look forward to following your patient along with you.       Sincerely,    Swetha Fitzpatrick MD

## 2023-02-01 ENCOUNTER — HOSPITAL ENCOUNTER (OUTPATIENT)
Dept: SLEEP MEDICINE | Age: 68
Discharge: HOME | End: 2023-02-01
Payer: MEDICARE

## 2023-02-01 VITALS
HEIGHT: 62 IN | SYSTOLIC BLOOD PRESSURE: 132 MMHG | TEMPERATURE: 97 F | OXYGEN SATURATION: 98 % | WEIGHT: 148 LBS | DIASTOLIC BLOOD PRESSURE: 68 MMHG | BODY MASS INDEX: 27.23 KG/M2 | HEART RATE: 86 BPM

## 2023-02-01 DIAGNOSIS — G47.33 OBSTRUCTIVE SLEEP APNEA (ADULT) (PEDIATRIC): ICD-10-CM

## 2023-02-01 PROCEDURE — 95810 POLYSOM 6/> YRS 4/> PARAM: CPT | Performed by: INTERNAL MEDICINE

## 2023-02-02 ENCOUNTER — DOCUMENTATION ONLY (OUTPATIENT)
Dept: SLEEP MEDICINE | Age: 68
End: 2023-02-02

## 2023-02-02 NOTE — PROGRESS NOTES
217 [facility] [place]. [place], [address]  Tel.  [phone]  Fax. 100 [facility] 60  [place], [address] [facility]  Tel.  [phone]  Fax. [phone] [address] [facility] [address]   Tel.  [phone]  Fax. [phone]     Sleep Study Technical Notes        PRE-Test:  Dalia Siegel (: 1955) arrived in the lobby. Patient was greeted and screening questions asked. The patient was taken to the Sleep Center and taken directly to his/her room. Vitals:  Temperature (97.0)   BP (132/68)   SaO2 (98)   Weight per patient (148)  Procedure explained to the patient and questions were answered. The patient expressed understanding of the procedure. Electrodes were applied without incident. The patient was placed in bed and the study was started. Acquisition Notes:  Lights off: 10:15pm    Respiratory events: none observed  ECG:  NSR  Snoring:  mild, periodic   Other comments:   Patient to bathroom  times      POST Test:  Patient was awakened. Electrodes were removed. The patient was discharged after answering the Post Questionnaire. Patient stated that she was alert and ok to drive. Equipment and room cleaned per infection control policy.

## 2023-02-03 ENCOUNTER — TELEPHONE (OUTPATIENT)
Dept: SLEEP MEDICINE | Age: 68
End: 2023-02-03

## 2023-02-09 ENCOUNTER — DOCUMENTATION ONLY (OUTPATIENT)
Dept: SLEEP MEDICINE | Age: 68
End: 2023-02-09

## 2023-02-09 NOTE — TELEPHONE ENCOUNTER
Results of sleep study in [facility]  Lead tech to convey results to patient    Results of sleep study in [facility]  Lead tech to convey results to patient      PSG was negative for significant apnea. AHI was 0.3/hour, lowest oxygen saturation was 92%. A positive test shows an apnea index/AHI of >5/hour. Therefore, PAP therapy is not indicated at this time. Mild snoring was noted. she fell asleep in 27 minutes. she appeared to have difficulty sleeping after 2:30 AM or so. . Sleep efficiency was 54%. All stages of sleep seen. she should ensure  a regular sleep wake cycle. Should avoid spending too much time in bed. She should not exceed 7 hours in her bed. . she  should avoid looking at the clock during the night. Ideally, the clock face should be turned away. she should not look at her phone during the night. she should minimize caffeine use and to avoid caffeine-containing beverages 8 hours prior to bedtime. A regular exercise schedule, at least 3 hours before bedtime, would be beneficial to improving sleep quality. she should avoid evening light in the spring and summer months and to use sunglasses in the late afternoon. Watching TV, using laptops, tablets and smartphones in the evening is discouraged. she  should  keep the bedroom cool and dark. Pets should not be allowed to sleep in the bed. She should continue to work with her psychiatrist and PMD to ensure depression optimally controlled. Repeat testing is indicated if symptoms worsen.

## 2023-03-08 ENCOUNTER — OFFICE VISIT (OUTPATIENT)
Dept: INTERNAL MEDICINE CLINIC | Age: 68
End: 2023-03-08
Payer: MEDICARE

## 2023-03-08 VITALS
WEIGHT: 157 LBS | OXYGEN SATURATION: 97 % | DIASTOLIC BLOOD PRESSURE: 56 MMHG | HEART RATE: 61 BPM | RESPIRATION RATE: 16 BRPM | SYSTOLIC BLOOD PRESSURE: 95 MMHG | BODY MASS INDEX: 28.89 KG/M2 | HEIGHT: 62 IN | TEMPERATURE: 98.3 F

## 2023-03-08 DIAGNOSIS — M19.019 SHOULDER ARTHRITIS: ICD-10-CM

## 2023-03-08 DIAGNOSIS — E61.1 IRON DEFICIENCY: Primary | ICD-10-CM

## 2023-03-08 DIAGNOSIS — R53.83 FATIGUE, UNSPECIFIED TYPE: ICD-10-CM

## 2023-03-08 PROCEDURE — G8536 NO DOC ELDER MAL SCRN: HCPCS | Performed by: INTERNAL MEDICINE

## 2023-03-08 PROCEDURE — G8399 PT W/DXA RESULTS DOCUMENT: HCPCS | Performed by: INTERNAL MEDICINE

## 2023-03-08 PROCEDURE — 1101F PT FALLS ASSESS-DOCD LE1/YR: CPT | Performed by: INTERNAL MEDICINE

## 2023-03-08 PROCEDURE — G8417 CALC BMI ABV UP PARAM F/U: HCPCS | Performed by: INTERNAL MEDICINE

## 2023-03-08 PROCEDURE — G8432 DEP SCR NOT DOC, RNG: HCPCS | Performed by: INTERNAL MEDICINE

## 2023-03-08 PROCEDURE — 99213 OFFICE O/P EST LOW 20 MIN: CPT | Performed by: INTERNAL MEDICINE

## 2023-03-08 PROCEDURE — 3017F COLORECTAL CA SCREEN DOC REV: CPT | Performed by: INTERNAL MEDICINE

## 2023-03-08 PROCEDURE — G8427 DOCREV CUR MEDS BY ELIG CLIN: HCPCS | Performed by: INTERNAL MEDICINE

## 2023-03-08 PROCEDURE — 1090F PRES/ABSN URINE INCON ASSESS: CPT | Performed by: INTERNAL MEDICINE

## 2023-03-08 PROCEDURE — G9899 SCRN MAM PERF RSLTS DOC: HCPCS | Performed by: INTERNAL MEDICINE

## 2023-03-08 PROCEDURE — G0463 HOSPITAL OUTPT CLINIC VISIT: HCPCS | Performed by: INTERNAL MEDICINE

## 2023-03-08 RX ORDER — FERROUS SULFATE 325(65) MG
325 TABLET, DELAYED RELEASE (ENTERIC COATED) ORAL
Qty: 36 TABLET | Refills: 0 | Status: SHIPPED | OUTPATIENT
Start: 2023-03-08 | End: 2023-06-06

## 2023-03-08 NOTE — PROGRESS NOTES
Gary Purdy is a 75 y.o. female  Chief Complaint   Patient presents with    Sleep Problem     Follow up - sleep study done in 02/2023 - would like a referral to PT      Visit Vitals  BP (!) 95/56 (BP 1 Location: Left upper arm, BP Patient Position: Sitting, BP Cuff Size: Adult)   Pulse 61   Temp 98.3 °F (36.8 °C) (Temporal)   Resp 16   Ht 5' 2\" (1.575 m)   Wt 157 lb (71.2 kg)   SpO2 97%   BMI 28.72 kg/m²          HPI    Ms. Gary Purdy presents after sleep study. She was here for for wellness exam in [address] [place] [address] and saw Dr. Martha Dean and recommend sleep study which was normal. She wants explanation for her fatigue. She has a recent blood work that showed iron deficiency and was off iron supplement. Her ferritin and iron saturation were law, denies hematuria, hematochezia, melena. Patient had colonsocopy about 2 years ago and it was normal. She has History of HCV and is under surveillance by hepatologist.     Blood pressure is low today, asked her to monitor blood pressure and can cut the hctz in half. Shoulder pain: recently worsened, she wants a Physical therapy. Patient is working with orthopedics for shoulder replacement. Past Medical History:   Diagnosis Date    Anemia 2018    Arthritis 2018    Attention deficit disorder     Depression 1993    Hepatitis C     Hypertension     Nausea & vomiting     Other ill-defined conditions(799.89)     hep c- treated 2002    Other ill-defined conditions(799.89)     ADD    Other ill-defined conditions(799.89)     depression    Unspecified adverse effect of anesthesia     delayed awakening             ROS  Review of Systems   All other systems reviewed and are negative. EXAM  Physical Exam  Vitals reviewed. Constitutional:       Appearance: Normal appearance. HENT:      Head: Normocephalic. Right Ear: Tympanic membrane normal.   Cardiovascular:      Rate and Rhythm: Normal rate and regular rhythm. Pulses: Normal pulses.       Heart sounds: Normal heart sounds. No murmur heard. Pulmonary:      Effort: Pulmonary effort is normal.      Breath sounds: Normal breath sounds. Abdominal:      General: There is no distension. Neurological:      Mental Status: She is alert. Health Maintenance Due   Topic Date Due    Hepatitis C Screening  Never done    COVID-19 Vaccine (1) Never done    DTaP/Tdap/Td series (1 - Tdap) Never done    Shingles Vaccine (1 of 2) Never done       ASSESSMENT/PLAN    Diagnoses and all orders for this visit:    1. Iron deficiency  -     ferrous sulfate (IRON) 325 mg (65 mg iron) EC tablet; Take 1 Tablet by mouth every Monday, Wednesday, Friday for 90 days. 2. Shoulder arthritis  -     REFERRAL TO PHYSICAL THERAPY    3. Fatigue, unspecified type  -take iron sulfate 3 times weekly and recheck ferritin in about 2-3 months. On her next visit.          Viola Mcelroy MD

## 2023-03-24 ENCOUNTER — OFFICE VISIT (OUTPATIENT)
Dept: INTERNAL MEDICINE CLINIC | Age: 68
End: 2023-03-24

## 2023-03-24 VITALS
BODY MASS INDEX: 28.89 KG/M2 | OXYGEN SATURATION: 95 % | TEMPERATURE: 97 F | DIASTOLIC BLOOD PRESSURE: 58 MMHG | HEART RATE: 66 BPM | SYSTOLIC BLOOD PRESSURE: 102 MMHG | HEIGHT: 62 IN | WEIGHT: 157 LBS | RESPIRATION RATE: 20 BRPM

## 2023-03-24 DIAGNOSIS — K52.9 GASTROENTERITIS: Primary | ICD-10-CM

## 2023-03-24 DIAGNOSIS — E61.1 IRON DEFICIENCY: ICD-10-CM

## 2023-03-24 DIAGNOSIS — R80.9 PROTEINURIA, UNSPECIFIED TYPE: ICD-10-CM

## 2023-03-24 NOTE — PROGRESS NOTES
Cora Mason is a 75 y.o. female  Chief Complaint   Patient presents with    ED Follow-up     Visit Vitals  BP (!) 102/58 (BP 1 Location: Right upper arm, BP Patient Position: Sitting, BP Cuff Size: Adult)   Pulse 66   Temp 97 °F (36.1 °C) (Temporal)   Resp 20   Ht 5' 2\" (1.575 m)   Wt 157 lb (71.2 kg)   SpO2 95%   BMI 28.72 kg/m²          HPI  Ms. Cora Mason Went [facility] last week for food poisoning and feels much better but there was protein and glucose in her urine and she was told to follow up. She is not on any medication at that may cause protein/glucose in urine, denies dysuria, fever or chills, or abdominal pain. Symptoms of nausea and vomiting resolved. Past Medical History:   Diagnosis Date    Anemia 2018    Arthritis 2018    Attention deficit disorder     Depression 1993    Hepatitis C     Hypertension     Nausea & vomiting     Other ill-defined conditions(799.89)     hep c- treated 2002    Other ill-defined conditions(799.89)     ADD    Other ill-defined conditions(799.89)     depression    Unspecified adverse effect of anesthesia     delayed awakening             ROS  Review of Systems   All other systems reviewed and are negative. EXAM  Physical Exam  Vitals reviewed. Constitutional:       Appearance: Normal appearance. HENT:      Head: Normocephalic and atraumatic. Cardiovascular:      Rate and Rhythm: Normal rate and regular rhythm. Pulses: Normal pulses. Heart sounds: Normal heart sounds. No murmur heard. Pulmonary:      Effort: Pulmonary effort is normal. No respiratory distress. Breath sounds: Normal breath sounds. Abdominal:      General: Abdomen is flat. Tenderness: There is no right CVA tenderness or left CVA tenderness. Neurological:      Mental Status: She is alert.    Psychiatric:         Mood and Affect: Mood normal.     Health Maintenance Due   Topic Date Due    COVID-19 Vaccine (1) Never done    DTaP/Tdap/Td series (1 - Tdap) Never done Shingles Vaccine (1 of 2) Never done       ASSESSMENT/PLAN    Diagnoses and all orders for this visit:    1. Gastroenteritis    2. Proteinuria, unspecified type  Comments:  protein and glucose in urine at OSH   repeat test   Orders:  -     URINALYSIS W/ RFLX MICROSCOPIC; Future    3. Iron deficiency  -     FERRITIN; Future  -     IRON PROFILE;  Future        Jose Franco MD

## 2023-03-24 NOTE — PROGRESS NOTES
Patient is here for ED follow up. No chief complaint on file. Vitals:    03/24/23 1038   Temp: 97 °F (36.1 °C)   TempSrc: Temporal   Weight: 157 lb (71.2 kg)       Current Outpatient Medications on File Prior to Visit   Medication Sig Dispense Refill    ferrous sulfate (IRON) 325 mg (65 mg iron) EC tablet Take 1 Tablet by mouth every Monday, Wednesday, Friday for 90 days. 36 Tablet 0    ARIPiprazole (ABILIFY) 10 mg tablet Take 1 Tablet by mouth daily for 90 days. 90 Tablet 0    dextroamphetamine-amphetamine (ADDERALL) 20 mg tablet Take 20 mg by mouth two (2) times a day. mirtazapine (REMERON) 15 mg tablet Take 15 mg by mouth as needed. losartan (COZAAR) 100 mg tablet Take 100 mg by mouth daily. hydrochlorothiazide (HYDRODIURIL) 25 mg tablet Take 25 mg by mouth daily. escitalopram oxalate (LEXAPRO) 20 mg tablet Take 20 mg by mouth daily. varicella-zoster recombinant, PF, (Shingrix, PF,) 50 mcg/0.5 mL susr injection 0.5mL by IntraMUSCular route once now and then repeat in 2-6 months (Patient not taking: No sig reported) 0.5 mL 1    cholecalciferol (VITAMIN D3) 25 mcg (1,000 unit) cap Take 25 mcg by mouth daily. (Patient not taking: No sig reported)       No current facility-administered medications on file prior to visit. Health Maintenance Due   Topic    Hepatitis C Screening     COVID-19 Vaccine (1)    DTaP/Tdap/Td series (1 - Tdap)    Shingles Vaccine (1 of 2)       1. \"Have you been to the ER, urgent care clinic since your last visit? Hospitalized since your last visit? \" Yes patient went to [facility] last Wednesday for food postioning     2. \"Have you seen or consulted any other health care providers outside of the [address] [place] [address] since your last visit? \" Yes last Wednesday for food poisoning     3. For patients aged 39-70: Has the patient had a colonoscopy / FIT/ Cologuard? Yes - no Care Gap present      If the patient is female:    4.  For patients aged 41-77: Has the patient had a mammogram within the past 2 years? Yes - no Care Gap present      5. For patients aged 21-65: Has the patient had a pap smear?  No

## 2023-03-30 DIAGNOSIS — E61.1 IRON DEFICIENCY: ICD-10-CM

## 2023-03-30 DIAGNOSIS — R80.9 PROTEINURIA, UNSPECIFIED TYPE: ICD-10-CM

## 2023-03-31 ENCOUNTER — TELEPHONE (OUTPATIENT)
Dept: INTERNAL MEDICINE CLINIC | Age: 68
End: 2023-03-31

## 2023-03-31 DIAGNOSIS — E61.1 IRON DEFICIENCY: Primary | ICD-10-CM

## 2023-03-31 LAB
APPEARANCE UR: CLEAR
BILIRUB UR QL: NEGATIVE
COLOR UR: NORMAL
GLUCOSE UR STRIP.AUTO-MCNC: NEGATIVE MG/DL
HGB UR QL STRIP: NEGATIVE
KETONES UR QL STRIP.AUTO: NEGATIVE MG/DL
LEUKOCYTE ESTERASE UR QL STRIP.AUTO: NEGATIVE
NITRITE UR QL STRIP.AUTO: NEGATIVE
PH UR STRIP: 5 (ref 5–8)
PROT UR STRIP-MCNC: NEGATIVE MG/DL
SP GR UR REFRACTOMETRY: 1.02 (ref 1–1.03)
UROBILINOGEN UR QL STRIP.AUTO: 0.2 EU/DL (ref 0.2–1)

## 2023-03-31 NOTE — TELEPHONE ENCOUNTER
----- Message from Cady Allen LPN sent at   3:47 PM EDT -----  Regarding: FW: Lab Notification:  Samples unable to be obtained    ----- Message -----  From: Leona Gaspar  Sent: 3/30/2023  11:51 AM EDT  To: [address] [facility] [place] [place]  Subject: Lab Notification:  Samples unable to be obta#    We have been notified that samples could not be obtained for the patient below's most recent lab work. Please place new orders prior to the patient's return. If additional assistance is required, please contact Client Services at [phone]. Patient:  Lester Mcdonough  :  1955  Ordering Provider:  ISIDRO Carter MD  Tests:  Iron Profile, Ferritin    Thank you,    [address] [place] [address] Laboratory Client Services

## 2023-05-04 DIAGNOSIS — E61.1 IRON DEFICIENCY: ICD-10-CM

## 2023-05-05 LAB
FERRITIN SERPL-MCNC: 24 NG/ML (ref 8–252)
IRON SATN MFR SERPL: 15 % (ref 20–50)
IRON SERPL-MCNC: 61 UG/DL (ref 35–150)
TIBC SERPL-MCNC: 402 UG/DL (ref 250–450)

## 2023-05-08 ENCOUNTER — TELEPHONE (OUTPATIENT)
Age: 68
End: 2023-05-08

## 2023-06-05 RX ORDER — FERROUS SULFATE 325(65) MG
TABLET ORAL
Qty: 36 TABLET | OUTPATIENT
Start: 2023-06-05

## 2023-06-20 ENCOUNTER — TELEPHONE (OUTPATIENT)
Age: 68
End: 2023-06-20

## 2023-06-23 ENCOUNTER — TELEPHONE (OUTPATIENT)
Age: 68
End: 2023-06-23

## 2023-06-23 DIAGNOSIS — G89.29 CHRONIC PAIN OF BOTH SHOULDERS: Primary | ICD-10-CM

## 2023-06-23 DIAGNOSIS — M25.511 CHRONIC PAIN OF BOTH SHOULDERS: Primary | ICD-10-CM

## 2023-06-23 DIAGNOSIS — M25.512 CHRONIC PAIN OF BOTH SHOULDERS: Primary | ICD-10-CM

## 2023-06-27 ENCOUNTER — TELEPHONE (OUTPATIENT)
Age: 68
End: 2023-06-27

## 2023-08-01 RX ORDER — FERROUS SULFATE 325(65) MG
TABLET ORAL
Qty: 36 TABLET | Refills: 1 | Status: SHIPPED | OUTPATIENT
Start: 2023-08-01

## 2024-02-26 ENCOUNTER — HOSPITAL ENCOUNTER (OUTPATIENT)
Facility: HOSPITAL | Age: 69
Discharge: HOME | End: 2024-02-29
Attending: ORTHOPAEDIC SURGERY
Payer: MEDICARE

## 2024-02-26 DIAGNOSIS — M19.011 OSTEOARTHRITIS OF RIGHT SHOULDER, UNSPECIFIED OSTEOARTHRITIS TYPE: ICD-10-CM

## 2024-02-26 PROCEDURE — 73200 CT UPPER EXTREMITY W/O DYE: CPT
